# Patient Record
Sex: FEMALE | Race: BLACK OR AFRICAN AMERICAN | Employment: OTHER | URBAN - METROPOLITAN AREA
[De-identification: names, ages, dates, MRNs, and addresses within clinical notes are randomized per-mention and may not be internally consistent; named-entity substitution may affect disease eponyms.]

---

## 2020-12-02 ENCOUNTER — OFFICE VISIT (OUTPATIENT)
Dept: ENDOCRINOLOGY | Age: 35
End: 2020-12-02
Payer: MEDICAID

## 2020-12-02 VITALS
HEIGHT: 64 IN | HEART RATE: 99 BPM | TEMPERATURE: 97.1 F | WEIGHT: 199.8 LBS | OXYGEN SATURATION: 98 % | SYSTOLIC BLOOD PRESSURE: 135 MMHG | BODY MASS INDEX: 34.11 KG/M2 | DIASTOLIC BLOOD PRESSURE: 94 MMHG

## 2020-12-02 DIAGNOSIS — E11.65 TYPE 2 DIABETES MELLITUS WITH HYPERGLYCEMIA, WITH LONG-TERM CURRENT USE OF INSULIN (HCC): Primary | ICD-10-CM

## 2020-12-02 DIAGNOSIS — Z79.4 TYPE 2 DIABETES MELLITUS WITH HYPERGLYCEMIA, WITH LONG-TERM CURRENT USE OF INSULIN (HCC): Primary | ICD-10-CM

## 2020-12-02 DIAGNOSIS — M79.672 LEFT FOOT PAIN: ICD-10-CM

## 2020-12-02 PROBLEM — E78.2 MIXED HYPERLIPIDEMIA: Status: ACTIVE | Noted: 2020-12-02

## 2020-12-02 PROBLEM — Z88.9 MULTIPLE DRUG ALLERGIES: Status: ACTIVE | Noted: 2020-12-02

## 2020-12-02 PROBLEM — I10 BENIGN ESSENTIAL HTN: Status: ACTIVE | Noted: 2020-12-02

## 2020-12-02 LAB — GLUCOSE POC: 264 MG/DL

## 2020-12-02 PROCEDURE — 99214 OFFICE O/P EST MOD 30 MIN: CPT | Performed by: INTERNAL MEDICINE

## 2020-12-02 PROCEDURE — 82962 GLUCOSE BLOOD TEST: CPT | Performed by: INTERNAL MEDICINE

## 2020-12-02 RX ORDER — INSULIN LISPRO 100 [IU]/ML
INJECTION, SUSPENSION SUBCUTANEOUS
Qty: 5 ADJUSTABLE DOSE PRE-FILLED PEN SYRINGE | Refills: 3 | Status: SHIPPED | OUTPATIENT
Start: 2020-12-02 | End: 2021-03-05 | Stop reason: SDUPTHER

## 2020-12-02 RX ORDER — TRIAMCINOLONE ACETONIDE 0.25 MG/ML
LOTION TOPICAL AS NEEDED
COMMUNITY

## 2020-12-02 RX ORDER — GLUCOSAM/CHON-MSM1/C/MANG/BOSW 500-416.6
TABLET ORAL
Qty: 100 LANCET | Refills: 3 | Status: SHIPPED | OUTPATIENT
Start: 2020-12-02 | End: 2021-03-05 | Stop reason: SDUPTHER

## 2020-12-02 RX ORDER — PEN NEEDLE, DIABETIC 30 GX3/16"
NEEDLE, DISPOSABLE MISCELLANEOUS
Qty: 1 PACKAGE | Refills: 5 | Status: SHIPPED | OUTPATIENT
Start: 2020-12-02 | End: 2021-03-05 | Stop reason: SDUPTHER

## 2020-12-02 RX ORDER — CALCIUM CITRATE/VITAMIN D3 200MG-6.25
TABLET ORAL
COMMUNITY
Start: 2020-11-28 | End: 2020-12-02 | Stop reason: SDUPTHER

## 2020-12-02 RX ORDER — CALCIUM CITRATE/VITAMIN D3 200MG-6.25
TABLET ORAL
Qty: 100 STRIP | Refills: 3 | Status: SHIPPED | OUTPATIENT
Start: 2020-12-02 | End: 2021-03-05 | Stop reason: SDUPTHER

## 2020-12-02 RX ORDER — CHOLECALCIFEROL (VITAMIN D3) 125 MCG
CAPSULE ORAL
COMMUNITY
End: 2022-03-21

## 2020-12-02 RX ORDER — GLUCOSAM/CHON-MSM1/C/MANG/BOSW 500-416.6
TABLET ORAL
COMMUNITY
Start: 2020-08-31 | End: 2020-12-02 | Stop reason: SDUPTHER

## 2020-12-02 RX ORDER — LISINOPRIL 2.5 MG/1
TABLET ORAL DAILY
COMMUNITY
End: 2022-03-21

## 2020-12-02 RX ORDER — ACETAMINOPHEN 325 MG/1
TABLET ORAL
COMMUNITY
End: 2022-03-21

## 2020-12-02 RX ORDER — INSULIN LISPRO 100 [IU]/ML
INJECTION, SUSPENSION SUBCUTANEOUS
COMMUNITY
Start: 2020-12-01 | End: 2020-12-02 | Stop reason: SDUPTHER

## 2020-12-02 NOTE — PROGRESS NOTES
History and Physical    Patient: Nory Orellana MRN: [id number]  SSN: [id number]    YOB: 1985  Age: 38 y.o. Sex: female      Subjective:      Nory Orellana is a 38 y.o. female with past medical history of hypertension, seizure disorder is here for follow-up of type 2 diabetes mellitus. She remains in primary care of Dr. Rosalita Lombard. patient is not tolerant to several diabetes medications. Bea Evans continues to have issues with cervical dystonia. She is taking Humalog 75/25, 25 units in the morning, 20 units in the evening. Sometimes she forgets and takes it after she eats. She checks blood glucose 1-2 times per day. Not able to follow diabetic diet. Not able to increase insulin dose because it makes her feel weak and tired although her blood glucose may not drop. She is trying her best to follow diabetic diet but because of financial and transportation limitations, she ends up eating junk food here and there. Today she is also telling me that she has skin infection at the site of insulin administration. She had diabetic eye exam and she was found to have diabetic retinopathy in left eye, laser surgery was recommended. Patient has not been able to make follow-up with his ophthalmologist because of the pandemic and she is looking for a new ophthalmologist.    Glucometer reading: checking 1-2 times per day, sometimes missing days.  Readings ranging from 93 to 417 mg/dL    updated diabetes history:  · Diagnosis: 2008    · Current treatment: Humalog 75/25, 25 units in the morning, 20 units before dinner    · Past treatment: Glipizide (shaking and constipation), Metformin (diarrhea), Januvia (rash), Glimepiride (shaking), Lantus    · Glucose checks: 200s fasting    · Hyperglycemia: yes, polyuria and polydipsia    · Hypoglycemia: no    · Meals per day: 3, breakfast: cereal, lunch: lunch meat and cheese with crackers, dinner: greens, chicken, snacks: whole grain peanut butter crackers, water, gatorade    · Exercise: walks 30 mins everyday    · DM related hospitalizations: yes long time back        Complications of DM:    · CAD: no    · CVA: no    · PVD: no    · Amputations: no     · Retinopathy: yes; last exam was 3/2019, next appointment tomorrow    · Gastropathy: no    · Nephropathy: no    · Neuropathy: yes        Medications:    · Statin: no    · ACE-I: lisinopril 2.5    · ASA: no        · Diabetes education: yes, 2012, 2014    Past Medical History:   Diagnosis Date    Diabetes insipidus ([address]     Hypertension      Past Surgical History:   Procedure Laterality Date    DC EXTRAC ERUPTED TOOTH/EXPOSED ROOT        Family History   Problem Relation Age of Onset    Diabetes Mother     Hypertension Mother     Hypertension Father      Social History     Tobacco Use    Smoking status: Never Smoker    Smokeless tobacco: Never Used   Substance Use Topics    Alcohol use: Not Currently      Prior to Admission medications    Medication Sig Start Date End Date Taking? Authorizing Provider   triamcinolone acetonide 0.025 % lotion Apply  to affected area two (2) times a day. Yes Provider, Historical   lisinopriL (PRINIVIL, ZESTRIL) 2.5 mg tablet Take  by mouth daily. Yes Provider, Historical   acetaminophen (TylenoL) 325 mg tablet Take  by mouth every four (4) hours as needed for Pain. Yes Provider, Historical   naproxen sodium (Aleve) 220 mg cap Take  by mouth.    Yes Provider, Historical   HumaLOG Mix 75-25 KwikPen flexpen Inject 25 units twice a day before meals 12/2/20  Yes Preeti Dozier MD   TRUEplus Lancets 28 gauge misc Use to check glucose 33 times a day 12/2/20  Yes Preeti Dozier MD   True Metrix Glucose Test Strip strip Use to check glucose 3 times a day 12/2/20  Yes Preeti Dozier MD   Insulin Needles, Disposable, 31 gauge x 5/16\" ndle Use to take insulin twice a day 12/2/20  Yes Preeti Dozier MD        Allergies   Allergen Reactions    Ammonium Lactate Unknown (comments)    Azithromycin Unknown (comments)    Buspirone Unknown (comments)    Clindamycin Unknown (comments)    Codeine Hives    Cold Relief [Cpm-Phenyleph-Acetaminophen] Unknown (comments)    Doxycycline Unknown (comments)    Fenofibrate Unknown (comments)    Gabapentin Unknown (comments)    Glimepiride Unknown (comments)    Glipizide Unknown (comments)    Januvia [Sitagliptin] Unknown (comments)    Lantus Solostar U-100 Insulin [Insulin Glargine] Unknown (comments)    Metformin Unknown (comments)    Metronidazole Unknown (comments)    Omeprazole Unknown (comments)    Penicillins Hives    Promethazine Unknown (comments)    Tramadol Unknown (comments)    Zanaflex [Tizanidine] Unknown (comments)    Ziprasidone Unknown (comments)    Zyrtec [Cetirizine] Unknown (comments)       Review of Systems:  ROS    A comprehensive review of systems was preformed and it is negative except mentioned in HPI    Objective:     Vitals:    12/02/20 1412   BP: (!) 135/94   Pulse: 99   Temp: 97.1 °F (36.2 °C)   TempSrc: Temporal   SpO2: 98%   Weight: 199 lb 12.8 oz (90.6 kg)   Height: 5' 3.8\" (1.621 m)        Physical Exam:    Physical Exam  Vitals signs and nursing note reviewed. Constitutional:       Appearance: Normal appearance. HENT:      Head: Normocephalic and atraumatic. Cardiovascular:      Rate and Rhythm: Normal rate and regular rhythm. Pulmonary:      Effort: Pulmonary effort is normal.      Breath sounds: Normal breath sounds. Neurological:      Mental Status: She is alert.           Labs and Imaging:    Last 3 Recorded Weights in this Encounter    12/02/20 1412   Weight: 199 lb 12.8 oz (90.6 kg)        No results found for: HBA1C, HGBE8, FYS2DZBZ, VTI6PXZE, FHA7YBHZ     Assessment:     Patient Active Problem List   Diagnosis Code    Type 2 diabetes mellitus with hyperglycemia, with long-term current use of insulin (HCC) E11.65, Z79.4    Left foot pain M79.672    Benign essential HTN I10    Mixed hyperlipidemia E78.2    Multiple drug allergies Z88.9           Plan:     type II diabetes mellitus uncontrolled  Hemoglobin A1c was 9.9% on 7-, 7.8% on 10-, 10.8% on 2-, 11.7% on 5-. Fingerstick blood glucose is 264 mg/dL in my office today. Up to date with diabetes related annual labs: yes    Up to date with diabetic eye exam: yes    patient has not tolerated metformin, Januvia, glipizide and glimepiride in the past  plan:  reinforced to patient importance of cutting down on carbohydrate intake, avoiding snacking. Physical activity as tolerated. Discussed with patient that given her intolerance to several medications, our options are limited. Since she frequently forgets to check her blood glucose, basal/bolus regimen may not be ideal for her. For this reason I am continuing with premixed insulin. As patient is not agreeable to increase her dose, I will continue with Humalog 75/25, 25 units twice a day. Advised patient to work on diabetic diet. Check blood glucose 3 times a day and I will see her back in 3 months. retinopathy with type 2 diabetes mellitus  diabetic retinopathy in left eye. Encouraged patient to go ahead with laser surgery to preserve her vision. Patient is unable to schedule appointment with the previous ophthalmologist and she is looking for a new one.    essential hypertension  BP well controlled on current medications. no microalbuminuria. mixed hyperlipidemia  labs 451943 showed total cholesterol elevated at 232, LDL elevated at 149. Patient was educated about low fat diet. 5-: LDL elevated at 211, total cholesterol elevated at 287. Patient has not tolerated statins in the past, unknown reaction. She is not agreeable to start another medication trial at this time. Discussed with patient about low-fat diet and increasing physical activity.   Orders Placed This Encounter    HEMOGLOBIN A1C WITH EAG    REFERRAL TO PODIATRY     Referral Priority:   Routine     Referral Type:   Consultation     Referral Reason:   Specialty Services Required     Referred to Provider:   Gianna Mckeon DPM     Number of Visits Requested:   1    AMB POC GLUCOSE BLOOD, BY GLUCOSE MONITORING DEVICE    HumaLOG Mix 75-25 KwikPen flexpen     Sig: Inject 25 units twice a day before meals     Dispense:  5 Adjustable Dose Pre-filled Pen Syringe     Refill:  3    TRUEplus Lancets 28 gauge misc     Sig: Use to check glucose 33 times a day     Dispense:  100 Lancet     Refill:  3    True Metrix Glucose Test Strip strip     Sig: Use to check glucose 3 times a day     Dispense:  100 Strip     Refill:  3    Insulin Needles, Disposable, 31 gauge x 5/16\" ndle     Sig: Use to take insulin twice a day     Dispense:  1 Package     Refill:  5        Signed By: Dani Ham MD     December 2, 2020      Return to clinic 3 months

## 2020-12-03 LAB
EST. AVERAGE GLUCOSE BLD GHB EST-MCNC: 240 MG/DL
HBA1C MFR BLD: 10 % (ref 4.8–5.6)

## 2020-12-08 ENCOUNTER — TELEPHONE (OUTPATIENT)
Dept: ENDOCRINOLOGY | Age: 35
End: 2020-12-08

## 2020-12-08 NOTE — TELEPHONE ENCOUNTER
Left patient a vm asking to give the office a call back in regards to labs    ----- Message from Gatito Diaz MD sent at 12/3/2020 10:40 AM EST -----  Please inform patient that her hemoglobin A1c is 10%. It was 11.7% last time.

## 2020-12-11 ENCOUNTER — TELEPHONE (OUTPATIENT)
Dept: ENDOCRINOLOGY | Age: 35
End: 2020-12-11

## 2020-12-11 NOTE — TELEPHONE ENCOUNTER
Left patient a vm asking to give the office a call back in regards to her labs    ----- Message from Nadeem Martinez MD sent at 12/3/2020 10:40 AM EST -----  Please inform patient that her hemoglobin A1c is 10%. It was 11.7% last time.

## 2020-12-16 ENCOUNTER — TELEPHONE (OUTPATIENT)
Dept: ENDOCRINOLOGY | Age: 35
End: 2020-12-16

## 2021-03-05 ENCOUNTER — OFFICE VISIT (OUTPATIENT)
Dept: ENDOCRINOLOGY | Age: 36
End: 2021-03-05
Payer: MEDICAID

## 2021-03-05 VITALS
OXYGEN SATURATION: 99 % | BODY MASS INDEX: 34.19 KG/M2 | SYSTOLIC BLOOD PRESSURE: 149 MMHG | HEIGHT: 64 IN | WEIGHT: 200.3 LBS | DIASTOLIC BLOOD PRESSURE: 106 MMHG | TEMPERATURE: 97.8 F | HEART RATE: 104 BPM

## 2021-03-05 DIAGNOSIS — Z79.4 TYPE 2 DIABETES MELLITUS WITH HYPERGLYCEMIA, WITH LONG-TERM CURRENT USE OF INSULIN (HCC): Primary | ICD-10-CM

## 2021-03-05 DIAGNOSIS — E11.65 TYPE 2 DIABETES MELLITUS WITH HYPERGLYCEMIA, WITH LONG-TERM CURRENT USE OF INSULIN (HCC): Primary | ICD-10-CM

## 2021-03-05 LAB
GLUCOSE POC: 229 MG/DL
HBA1C MFR BLD HPLC: 11.1 %

## 2021-03-05 PROCEDURE — 83036 HEMOGLOBIN GLYCOSYLATED A1C: CPT | Performed by: INTERNAL MEDICINE

## 2021-03-05 PROCEDURE — 99213 OFFICE O/P EST LOW 20 MIN: CPT | Performed by: INTERNAL MEDICINE

## 2021-03-05 PROCEDURE — 82962 GLUCOSE BLOOD TEST: CPT | Performed by: INTERNAL MEDICINE

## 2021-03-05 RX ORDER — CALCIUM CITRATE/VITAMIN D3 200MG-6.25
TABLET ORAL
Qty: 100 STRIP | Refills: 3 | Status: SHIPPED | OUTPATIENT
Start: 2021-03-05 | End: 2022-03-21

## 2021-03-05 RX ORDER — PEN NEEDLE, DIABETIC 30 GX3/16"
NEEDLE, DISPOSABLE MISCELLANEOUS
Qty: 1 PACKAGE | Refills: 5 | Status: SHIPPED | OUTPATIENT
Start: 2021-03-05 | End: 2022-03-21

## 2021-03-05 RX ORDER — PEN NEEDLE, DIABETIC 32GX 5/32"
NEEDLE, DISPOSABLE MISCELLANEOUS
COMMUNITY
Start: 2020-12-07 | End: 2022-03-21

## 2021-03-05 RX ORDER — INSULIN LISPRO 100 [IU]/ML
INJECTION, SUSPENSION SUBCUTANEOUS
Qty: 5 ADJUSTABLE DOSE PRE-FILLED PEN SYRINGE | Refills: 3 | Status: SHIPPED | OUTPATIENT
Start: 2021-03-05 | End: 2022-03-21

## 2021-03-05 RX ORDER — GLUCOSAM/CHON-MSM1/C/MANG/BOSW 500-416.6
TABLET ORAL
Qty: 100 LANCET | Refills: 3 | Status: SHIPPED | OUTPATIENT
Start: 2021-03-05 | End: 2022-03-21

## 2021-03-05 NOTE — LETTER
3/5/2021 Patient: Kaveh Burnham YOB: 1985 Date of Visit: 3/5/2021 Avi Yusuf MD 
[address] [address] [address] Fax: [phone] Dear Avi Yusuf MD, Thank you for referring Ms. Kaveh Burnham to [address] [place] [address] for evaluation. My notes for this consultation are attached. If you have questions, please do not hesitate to call me. I look forward to following your patient along with you. Sincerely, Kelley Elizabeth MD

## 2021-03-05 NOTE — PROGRESS NOTES
History and Physical    Patient: Marina Macario MRN: [id number]  SSN: [id number]    YOB: 1985  Age: 38 y.o. Sex: female      Subjective:      Marina Macario is a 38 y.o. female with past medical history of hypertension, seizure disorder is here for follow-up of type 2 diabetes mellitus. She remains in primary care of Dr. Octavio Mayen. patient is not tolerant to several diabetes medications. Gerhardt Rake continues to have issues with cervical dystonia. She is taking Humalog 75/25, 25 units in the morning, 20 units in the evening. Sometimes she gives herself a break and does not take her insulin. She has not been checking her blood glucose for at least for the past 1 month because she misplaced her glucometer again. She has not made any significant changes in her eating habits. She had diabetic eye exam and she was found to have diabetic retinopathy in left eye, laser surgery was recommended. Patient has not been able to make follow-up with his ophthalmologist because of the pandemic and she is looking for a new ophthalmologist.  At the last visit she was advised to call her insurance to check about finding another ophthalmologist but she has not done this. Glucometer reading: Did not bring meter today.     updated diabetes history:  · Diagnosis: 2008    · Current treatment: Humalog 75/25, 25 units in the morning, 20 units before dinner    · Past treatment: Glipizide (shaking and constipation), Metformin (diarrhea), Januvia (rash), Glimepiride (shaking), Lantus    · Glucose checks: 200s fasting    · Hyperglycemia: yes, polyuria and polydipsia    · Hypoglycemia: no    · Meals per day: 3, breakfast: cereal, lunch: lunch meat and cheese with crackers, dinner: greens, chicken, snacks: whole grain peanut butter crackers, water, gatorade    · Exercise: walks 30 mins everyday    · DM related hospitalizations: yes long time back        Complications of DM:    · CAD: no    · CVA: no    · PVD: no    · Amputations: no     · Retinopathy: yes; last exam was 3/2019, next appointment tomorrow    · Gastropathy: no    · Nephropathy: no    · Neuropathy: yes        Medications:    · Statin: no    · ACE-I: lisinopril 2.5    · ASA: no        · Diabetes education: yes, 2012, 2014    Past Medical History:   Diagnosis Date    Diabetes insipidus ([address]     Hypertension      Past Surgical History:   Procedure Laterality Date    ND EXTRAC ERUPTED TOOTH/EXPOSED ROOT        Family History   Problem Relation Age of Onset    Diabetes Mother     Hypertension Mother     Hypertension Father      Social History     Tobacco Use    Smoking status: Never Smoker    Smokeless tobacco: Never Used   Substance Use Topics    Alcohol use: Not Currently      Prior to Admission medications    Medication Sig Start Date End Date Taking? Authorizing Provider   BD Radha 2nd Gen Pen Needle 32 gauge x 5/32\" ndle USE AS DIRECTED TWICE A DAY 12/7/20  Yes Provider, Historical   Insulin Needles, Disposable, 31 gauge x 5/16\" ndle Use to take insulin twice a day 3/5/21  Yes Paul Carrero MD   HumaLOG Mix 75-25 KwikPen flexpen Inject 25 units twice a day before meals 3/5/21  Yes Paul Carrero MD   True Metrix Glucose Test Strip strip Use to check glucose 3 times a day 3/5/21  Yes Paul Carrero MD   TRUEplus Lancets 28 gauge misc Use to check glucose 33 times a day 3/5/21  Yes Paul Carrero MD   triamcinolone acetonide 0.025 % lotion Apply  to affected area two (2) times a day. Yes Provider, Historical   lisinopriL (PRINIVIL, ZESTRIL) 2.5 mg tablet Take  by mouth daily. Yes Provider, Historical   acetaminophen (TylenoL) 325 mg tablet Take  by mouth every four (4) hours as needed for Pain. Yes Provider, Historical   naproxen sodium (Aleve) 220 mg cap Take  by mouth.    Yes Provider, Historical        Allergies   Allergen Reactions    Ammonium Lactate Unknown (comments)    Azithromycin Unknown (comments)    Buspirone Unknown (comments)    Clindamycin Unknown (comments)    Codeine Hives    Cold Relief [Cpm-Phenyleph-Acetaminophen] Unknown (comments)    Doxycycline Unknown (comments)    Fenofibrate Unknown (comments)    Gabapentin Unknown (comments)    Glimepiride Unknown (comments)    Glipizide Unknown (comments)    Januvia [Sitagliptin] Unknown (comments)    Lantus Solostar U-100 Insulin [Insulin Glargine] Unknown (comments)    Metformin Unknown (comments)    Metronidazole Unknown (comments)    Omeprazole Unknown (comments)    Penicillins Hives    Promethazine Unknown (comments)    Tramadol Unknown (comments)    Zanaflex [Tizanidine] Unknown (comments)    Ziprasidone Unknown (comments)    Zyrtec [Cetirizine] Unknown (comments)       Review of Systems:  ROS    A comprehensive review of systems was preformed and it is negative except mentioned in HPI    Objective:     Vitals:    03/05/21 1023 03/05/21 1030   BP: (!) 155/102 (!) 149/106   Pulse: 94 (!) 104   Temp: 97.8 °F (36.6 °C)    TempSrc: Temporal    SpO2: 99%    Weight: 200 lb 4.8 oz (90.9 kg)    Height: 5' 4\" (1.626 m)         Physical Exam:    Physical Exam  Vitals signs and nursing note reviewed. Constitutional:       Appearance: Normal appearance. HENT:      Head: Normocephalic and atraumatic. Cardiovascular:      Rate and Rhythm: Normal rate and regular rhythm. Pulmonary:      Effort: Pulmonary effort is normal.      Breath sounds: Normal breath sounds. Neurological:      Mental Status: She is alert.           Labs and Imaging:    Last 3 Recorded Weights in this Encounter    03/05/21 1023   Weight: 200 lb 4.8 oz (90.9 kg)        Lab Results   Component Value Date/Time    Hemoglobin A1c 10.0 (H) 12/02/2020 02:58 PM        Assessment:     Patient Active Problem List   Diagnosis Code    Type 2 diabetes mellitus with hyperglycemia, with long-term current use of insulin (HCC) E11.65, Z79.4    Left foot pain M79.672    Benign essential HTN I10    Mixed hyperlipidemia E78.2    Multiple drug allergies Z88.9           Plan:     type II diabetes mellitus uncontrolled  Hemoglobin A1c was 9.9% on 7-, 7.8% on 10-, 10.8% on 2-, 11.7% on 5-, 10% on 12-2-2020, 11.1% today. Fingerstick blood glucose is 229 mg/dL in my office today. Up to date with diabetes related annual labs: yes    Up to date with diabetic eye exam: no    patient has not tolerated metformin, Januvia, glipizide and glimepiride in the past  plan:  Explained to patient that there has not been any improvement in her diabetes in the past year and a half that I have been treating her. She has several medication intolerances and our options are limited. She does not check blood glucose regularly, so basal/bolus regimen is not appropriate for her. I kept her on premixed insulin so that it will be less number of injections. However, she keeps misplacing her glucometer, does not check blood glucose regularly, she has not made any changes in her eating habits. Explained to patient that I am doing my best but since I do not see any improvement in her diabetes control, I would like her to see another endocrinologist for a fresh start. I am giving her enough insulin and testing supplies for next 3 months. She should call her insurance company to find another endocrinologist in network. retinopathy with type 2 diabetes mellitus  diabetic retinopathy in left eye. Encouraged patient to go ahead with laser surgery to preserve her vision. Patient is unable to schedule appointment with the previous ophthalmologist and she is looking for a new one.    essential hypertension  BP well controlled on current medications. no microalbuminuria. mixed hyperlipidemia  labs 772753 showed total cholesterol elevated at 232, LDL elevated at 149. Patient was educated about low fat diet. 5-: LDL elevated at 211, total cholesterol elevated at 287.  Patient has not tolerated statins in the past, unknown reaction. She is not agreeable to start another medication trial at this time. Discussed with patient about low-fat diet and increasing physical activity.   Orders Placed This Encounter    AMB POC HEMOGLOBIN A1C    AMB POC GLUCOSE BLOOD, BY GLUCOSE MONITORING DEVICE    Insulin Needles, Disposable, 31 gauge x 5/16\" ndle     Sig: Use to take insulin twice a day     Dispense:  1 Package     Refill:  5    HumaLOG Mix 75-25 KwikPen flexpen     Sig: Inject 25 units twice a day before meals     Dispense:  5 Adjustable Dose Pre-filled Pen Syringe     Refill:  3    True Metrix Glucose Test Strip strip     Sig: Use to check glucose 3 times a day     Dispense:  100 Strip     Refill:  3    TRUEplus Lancets 28 gauge misc     Sig: Use to check glucose 33 times a day     Dispense:  100 Lancet     Refill:  3        Signed By: Darío Chavira MD     March 5, 2021      Return to clinic as needed

## 2021-12-30 ENCOUNTER — TRANSCRIBE ORDER (OUTPATIENT)
Dept: REGISTRATION | Age: 36
End: 2021-12-30

## 2021-12-30 ENCOUNTER — HOSPITAL ENCOUNTER (OUTPATIENT)
Dept: GENERAL RADIOLOGY | Age: 36
Discharge: HOME | End: 2021-12-30
Payer: COMMERCIAL

## 2021-12-30 DIAGNOSIS — Z02.71 ISSUE OF INCAPACITY CERTIFICATE: ICD-10-CM

## 2021-12-30 DIAGNOSIS — Z02.71 ISSUE OF INCAPACITY CERTIFICATE: Primary | ICD-10-CM

## 2021-12-30 PROCEDURE — 73590 X-RAY EXAM OF LOWER LEG: CPT

## 2021-12-30 PROCEDURE — 73560 X-RAY EXAM OF KNEE 1 OR 2: CPT

## 2022-02-25 ENCOUNTER — TRANSCRIBE ORDER (OUTPATIENT)
Dept: SCHEDULING | Age: 37
End: 2022-02-25

## 2022-02-25 DIAGNOSIS — R07.9 CHEST PAIN: Primary | ICD-10-CM

## 2022-03-14 ENCOUNTER — HOSPITAL ENCOUNTER (OUTPATIENT)
Dept: NON INVASIVE DIAGNOSTICS | Age: 37
Discharge: HOME | End: 2022-03-14
Attending: NURSE PRACTITIONER
Payer: MEDICAID

## 2022-03-14 ENCOUNTER — HOSPITAL ENCOUNTER (OUTPATIENT)
Dept: NUCLEAR MEDICINE | Age: 37
Discharge: HOME | End: 2022-03-14
Attending: NURSE PRACTITIONER
Payer: MEDICAID

## 2022-03-14 ENCOUNTER — HOSPITAL ENCOUNTER (OUTPATIENT)
Dept: VASCULAR SURGERY | Age: 37
Discharge: HOME | End: 2022-03-14
Attending: NURSE PRACTITIONER
Payer: MEDICAID

## 2022-03-14 DIAGNOSIS — R07.9 CHEST PAIN: ICD-10-CM

## 2022-03-14 LAB
ECHO AV ANNULUS DIAM: 2.5 CM
ECHO AV AREA PEAK VELOCITY: 2.6 CM2
ECHO AV AREA VTI: 2.7 CM2
ECHO AV MEAN GRADIENT: 3 MMHG
ECHO AV PEAK VELOCITY: 1.1 M/S
ECHO AV VTI: 19.4 CM
ECHO EST RA PRESSURE: 3 MMHG
ECHO LA VOL 2C: 23 ML (ref 22–52)
ECHO LV EDV A2C: 33 ML
ECHO LV EDV A4C: 40 ML
ECHO LV EJECTION FRACTION BIPLANE: 64 % (ref 55–100)
ECHO LV ESV A2C: 9 ML
ECHO LV ESV A4C: 20 ML
ECHO LV FRACTIONAL SHORTENING: 29 % (ref 28–44)
ECHO LV INTERNAL DIMENSION DIASTOLIC: 3.4 CM (ref 3.9–5.3)
ECHO LV INTERNAL DIMENSION SYSTOLIC: 2.4 CM
ECHO LV IVSD: 1.1 CM (ref 0.6–0.9)
ECHO LV MASS 2D: 114 G (ref 67–162)
ECHO LV POSTERIOR WALL DIASTOLIC: 1.1 CM (ref 0.6–0.9)
ECHO LV RELATIVE WALL THICKNESS RATIO: 0.65
ECHO LVOT AREA: 3.5 CM2
ECHO LVOT AV VTI INDEX: 0.74
ECHO LVOT DIAM: 2.1 CM
ECHO LVOT MEAN GRADIENT: 1 MMHG
ECHO LVOT PEAK GRADIENT: 3 MMHG
ECHO LVOT SV: 49.5 ML
ECHO LVOT VTI: 14.3 CM
ECHO MV A VELOCITY: 0.55 M/S
ECHO MV AREA PHT: 4 CM2
ECHO MV E DECELERATION TIME (DT): 189.4 MS
ECHO MV E VELOCITY: 0.55 M/S
ECHO MV E/A RATIO: 1
ECHO MV PRESSURE HALF TIME (PHT): 54.9 MS
ECHO RIGHT VENTRICULAR SYSTOLIC PRESSURE (RVSP): 12 MMHG
ECHO RV INTERNAL DIMENSION: 2.5 CM
ECHO RV TAPSE: 1.3 CM (ref 1.5–2)
ECHO TRICUSPID ANNULAR PEAK SYSTOLIC VELOCITY: 11 CM/S
ECHO TV REGURGITANT MAX VELOCITY: 1.54 M/S
NUC STRESS EJECTION FRACTION: 60 %
STRESS BASELINE DIAS BP: 89 MMHG
STRESS BASELINE HR: 87 BPM
STRESS BASELINE SYS BP: 135 MMHG
STRESS PEAK DIAS BP: 96 MMHG
STRESS PEAK SYS BP: 150 MMHG
STRESS PERCENT HR ACHIEVED: 62 %
STRESS POST PEAK HR: 113 BPM
STRESS RATE PRESSURE PRODUCT: NORMAL BPM*MMHG
STRESS ST DEPRESSION: 0 MM
STRESS TARGET HR: 182 BPM

## 2022-03-14 PROCEDURE — 93306 TTE W/DOPPLER COMPLETE: CPT

## 2022-03-14 PROCEDURE — A9500 TC99M SESTAMIBI: HCPCS

## 2022-03-14 PROCEDURE — 93017 CV STRESS TEST TRACING ONLY: CPT

## 2022-03-14 PROCEDURE — 74011250636 HC RX REV CODE- 250/636: Performed by: NURSE PRACTITIONER

## 2022-03-14 PROCEDURE — 74011000258 HC RX REV CODE- 258: Performed by: NURSE PRACTITIONER

## 2022-03-14 RX ORDER — TETRAKIS(2-METHOXYISOBUTYLISOCYANIDE)COPPER(I) TETRAFLUOROBORATE 1 MG/ML
10 INJECTION, POWDER, LYOPHILIZED, FOR SOLUTION INTRAVENOUS
Status: COMPLETED | OUTPATIENT
Start: 2022-03-14 | End: 2022-03-14

## 2022-03-14 RX ORDER — TETRAKIS(2-METHOXYISOBUTYLISOCYANIDE)COPPER(I) TETRAFLUOROBORATE 1 MG/ML
30 INJECTION, POWDER, LYOPHILIZED, FOR SOLUTION INTRAVENOUS
Status: COMPLETED | OUTPATIENT
Start: 2022-03-14 | End: 2022-03-14

## 2022-03-14 RX ADMIN — TETRAKIS(2-METHOXYISOBUTYLISOCYANIDE)COPPER(I) TETRAFLUOROBORATE 30 MILLICURIE: 1 INJECTION, POWDER, LYOPHILIZED, FOR SOLUTION INTRAVENOUS at 08:26

## 2022-03-14 RX ADMIN — ADENOSINE: 3 INJECTION, SOLUTION INTRAVENOUS at 09:16

## 2022-03-14 RX ADMIN — TETRAKIS(2-METHOXYISOBUTYLISOCYANIDE)COPPER(I) TETRAFLUOROBORATE 10 MILLICURIE: 1 INJECTION, POWDER, LYOPHILIZED, FOR SOLUTION INTRAVENOUS at 07:00

## 2022-03-17 ENCOUNTER — HOSPITAL ENCOUNTER (INPATIENT)
Age: 37
LOS: 4 days | Discharge: ANOTHER HOSPITAL | DRG: 190 | End: 2022-03-21
Attending: EMERGENCY MEDICINE | Admitting: INTERNAL MEDICINE
Payer: MEDICAID

## 2022-03-17 ENCOUNTER — APPOINTMENT (OUTPATIENT)
Dept: GENERAL RADIOLOGY | Age: 37
DRG: 190 | End: 2022-03-17
Attending: STUDENT IN AN ORGANIZED HEALTH CARE EDUCATION/TRAINING PROGRAM
Payer: MEDICAID

## 2022-03-17 DIAGNOSIS — I24.9 ACS (ACUTE CORONARY SYNDROME) (HCC): Primary | ICD-10-CM

## 2022-03-17 DIAGNOSIS — R07.9 CHEST PAIN, UNSPECIFIED TYPE: ICD-10-CM

## 2022-03-17 LAB
ALBUMIN SERPL-MCNC: 3.9 G/DL (ref 3.5–5)
ALBUMIN/GLOB SERPL: 1.1 {RATIO} (ref 1.1–2.2)
ALP SERPL-CCNC: 105 U/L (ref 45–117)
ALT SERPL-CCNC: 22 U/L (ref 12–78)
AMPHET UR QL SCN: NEGATIVE
ANION GAP SERPL CALC-SCNC: 5 MMOL/L (ref 5–15)
APPEARANCE UR: CLEAR
APTT PPP: 29.9 SEC (ref 21.2–34.1)
AST SERPL W P-5'-P-CCNC: 12 U/L (ref 15–37)
ATRIAL RATE: 92 BPM
BACTERIA URNS QL MICRO: NEGATIVE /HPF
BARBITURATES UR QL SCN: NEGATIVE
BASOPHILS # BLD: 0.1 K/UL (ref 0–0.1)
BASOPHILS NFR BLD: 1 % (ref 0–1)
BENZODIAZ UR QL: NEGATIVE
BILIRUB SERPL-MCNC: 0.5 MG/DL (ref 0.2–1)
BILIRUB UR QL: NEGATIVE
BUN SERPL-MCNC: 8 MG/DL (ref 6–20)
BUN/CREAT SERPL: 12 (ref 12–20)
CA-I BLD-MCNC: 9.2 MG/DL (ref 8.5–10.1)
CALCULATED P AXIS, ECG09: 50 DEGREES
CALCULATED R AXIS, ECG10: -5 DEGREES
CALCULATED T AXIS, ECG11: 20 DEGREES
CANNABINOIDS UR QL SCN: NEGATIVE
CHLORIDE SERPL-SCNC: 104 MMOL/L (ref 97–108)
CO2 SERPL-SCNC: 25 MMOL/L (ref 21–32)
COCAINE UR QL SCN: NEGATIVE
COLOR UR: ABNORMAL
CREAT SERPL-MCNC: 0.66 MG/DL (ref 0.55–1.02)
DIAGNOSIS, 93000: NORMAL
DIFFERENTIAL METHOD BLD: ABNORMAL
DRUG SCRN COMMENT,DRGCM: NORMAL
EOSINOPHIL # BLD: 0.1 K/UL (ref 0–0.4)
EOSINOPHIL NFR BLD: 1 % (ref 0–7)
ERYTHROCYTE [DISTWIDTH] IN BLOOD BY AUTOMATED COUNT: 11.2 % (ref 11.5–14.5)
GLOBULIN SER CALC-MCNC: 3.5 G/DL (ref 2–4)
GLUCOSE SERPL-MCNC: 321 MG/DL (ref 65–100)
GLUCOSE UR STRIP.AUTO-MCNC: >300 MG/DL
HCG UR QL: NEGATIVE
HCT VFR BLD AUTO: 38.3 % (ref 35–47)
HGB BLD-MCNC: 13.6 G/DL (ref 11.5–16)
HGB UR QL STRIP: NEGATIVE
IMM GRANULOCYTES # BLD AUTO: 0 K/UL (ref 0–0.04)
IMM GRANULOCYTES NFR BLD AUTO: 0 % (ref 0–0.5)
KETONES UR QL STRIP.AUTO: 20 MG/DL
LEUKOCYTE ESTERASE UR QL STRIP.AUTO: NEGATIVE
LYMPHOCYTES # BLD: 2.7 K/UL (ref 0.8–3.5)
LYMPHOCYTES NFR BLD: 30 % (ref 12–49)
MCH RBC QN AUTO: 30.7 PG (ref 26–34)
MCHC RBC AUTO-ENTMCNC: 35.5 G/DL (ref 30–36.5)
MCV RBC AUTO: 86.5 FL (ref 80–99)
METHADONE UR QL: NEGATIVE
MONOCYTES # BLD: 0.4 K/UL (ref 0–1)
MONOCYTES NFR BLD: 4 % (ref 5–13)
NEUTS SEG # BLD: 5.8 K/UL (ref 1.8–8)
NEUTS SEG NFR BLD: 64 % (ref 32–75)
NITRITE UR QL STRIP.AUTO: NEGATIVE
NRBC # BLD: 0 K/UL (ref 0–0.01)
NRBC BLD-RTO: 0 PER 100 WBC
OPIATES UR QL: NEGATIVE
P-R INTERVAL, ECG05: 188 MS
PCP UR QL: NEGATIVE
PH UR STRIP: 5 [PH] (ref 5–8)
PLATELET # BLD AUTO: 292 K/UL (ref 150–400)
PMV BLD AUTO: 11.9 FL (ref 8.9–12.9)
POTASSIUM SERPL-SCNC: 3.9 MMOL/L (ref 3.5–5.1)
PROT SERPL-MCNC: 7.4 G/DL (ref 6.4–8.2)
PROT UR STRIP-MCNC: NEGATIVE MG/DL
Q-T INTERVAL, ECG07: 370 MS
QRS DURATION, ECG06: 78 MS
QTC CALCULATION (BEZET), ECG08: 457 MS
RBC # BLD AUTO: 4.43 M/UL (ref 3.8–5.2)
RBC #/AREA URNS HPF: ABNORMAL /HPF (ref 0–5)
SODIUM SERPL-SCNC: 134 MMOL/L (ref 136–145)
SP GR UR REFRACTOMETRY: >1.03 (ref 1–1.03)
THERAPEUTIC RANGE,PTTT: NORMAL SEC (ref 82–109)
TROPONIN-HIGH SENSITIVITY: 237 NG/L (ref 0–51)
TROPONIN-HIGH SENSITIVITY: 266 NG/L (ref 0–51)
UA: UC IF INDICATED,UAUC: ABNORMAL
UROBILINOGEN UR QL STRIP.AUTO: 0.1 EU/DL (ref 0.1–1)
VENTRICULAR RATE, ECG03: 92 BPM
WBC # BLD AUTO: 8.9 K/UL (ref 3.6–11)
WBC URNS QL MICRO: ABNORMAL /HPF (ref 0–4)

## 2022-03-17 PROCEDURE — 74011250636 HC RX REV CODE- 250/636: Performed by: EMERGENCY MEDICINE

## 2022-03-17 PROCEDURE — 65270000029 HC RM PRIVATE

## 2022-03-17 PROCEDURE — 74011250637 HC RX REV CODE- 250/637: Performed by: EMERGENCY MEDICINE

## 2022-03-17 PROCEDURE — 99285 EMERGENCY DEPT VISIT HI MDM: CPT

## 2022-03-17 PROCEDURE — 96374 THER/PROPH/DIAG INJ IV PUSH: CPT

## 2022-03-17 PROCEDURE — 74011250636 HC RX REV CODE- 250/636: Performed by: INTERNAL MEDICINE

## 2022-03-17 PROCEDURE — 81025 URINE PREGNANCY TEST: CPT

## 2022-03-17 PROCEDURE — 93005 ELECTROCARDIOGRAM TRACING: CPT

## 2022-03-17 PROCEDURE — 85730 THROMBOPLASTIN TIME PARTIAL: CPT

## 2022-03-17 PROCEDURE — 74011250637 HC RX REV CODE- 250/637: Performed by: NURSE PRACTITIONER

## 2022-03-17 PROCEDURE — 71045 X-RAY EXAM CHEST 1 VIEW: CPT

## 2022-03-17 PROCEDURE — 84484 ASSAY OF TROPONIN QUANT: CPT

## 2022-03-17 PROCEDURE — 36415 COLL VENOUS BLD VENIPUNCTURE: CPT

## 2022-03-17 PROCEDURE — 81001 URINALYSIS AUTO W/SCOPE: CPT

## 2022-03-17 PROCEDURE — 85025 COMPLETE CBC W/AUTO DIFF WBC: CPT

## 2022-03-17 PROCEDURE — 96365 THER/PROPH/DIAG IV INF INIT: CPT

## 2022-03-17 PROCEDURE — 80053 COMPREHEN METABOLIC PANEL: CPT

## 2022-03-17 PROCEDURE — 80307 DRUG TEST PRSMV CHEM ANLYZR: CPT

## 2022-03-17 RX ORDER — ASPIRIN 325 MG
325 TABLET ORAL ONCE
Status: COMPLETED | OUTPATIENT
Start: 2022-03-17 | End: 2022-03-17

## 2022-03-17 RX ORDER — HEPARIN SODIUM 1000 [USP'U]/ML
2000 INJECTION, SOLUTION INTRAVENOUS; SUBCUTANEOUS AS NEEDED
Status: DISCONTINUED | OUTPATIENT
Start: 2022-03-17 | End: 2022-03-17

## 2022-03-17 RX ORDER — ENOXAPARIN SODIUM 100 MG/ML
60 INJECTION SUBCUTANEOUS ONCE
Status: COMPLETED | OUTPATIENT
Start: 2022-03-17 | End: 2022-03-17

## 2022-03-17 RX ORDER — HEPARIN SODIUM 1000 [USP'U]/ML
4000 INJECTION, SOLUTION INTRAVENOUS; SUBCUTANEOUS ONCE
Status: COMPLETED | OUTPATIENT
Start: 2022-03-17 | End: 2022-03-17

## 2022-03-17 RX ORDER — HEPARIN SODIUM 10000 [USP'U]/100ML
12-25 INJECTION, SOLUTION INTRAVENOUS
Status: DISCONTINUED | OUTPATIENT
Start: 2022-03-17 | End: 2022-03-17

## 2022-03-17 RX ORDER — HEPARIN SODIUM 1000 [USP'U]/ML
4000 INJECTION, SOLUTION INTRAVENOUS; SUBCUTANEOUS AS NEEDED
Status: DISCONTINUED | OUTPATIENT
Start: 2022-03-17 | End: 2022-03-17

## 2022-03-17 RX ORDER — ENOXAPARIN SODIUM 100 MG/ML
40 INJECTION SUBCUTANEOUS ONCE
Status: DISCONTINUED | OUTPATIENT
Start: 2022-03-17 | End: 2022-03-17

## 2022-03-17 RX ADMIN — ASPIRIN 325 MG ORAL TABLET 325 MG: 325 PILL ORAL at 17:44

## 2022-03-17 RX ADMIN — ENOXAPARIN SODIUM 60 MG: 100 INJECTION SUBCUTANEOUS at 23:46

## 2022-03-17 RX ADMIN — Medication 12 UNITS/KG/HR: at 17:45

## 2022-03-17 RX ADMIN — HEPARIN SODIUM 4000 UNITS: 1000 INJECTION, SOLUTION INTRAVENOUS; SUBCUTANEOUS at 17:44

## 2022-03-17 RX ADMIN — TICAGRELOR 180 MG: 90 TABLET ORAL at 18:54

## 2022-03-17 NOTE — ED TRIAGE NOTES
Presents by ems  from cardiology office (Dr Jillian Sharp)  For unstable angina. N/v times one week.

## 2022-03-17 NOTE — CONSULTS
CONSULTATION    REASON FOR CONSULT:  Chest pain    REQUESTING PROVIDER:  See chart    CHIEF COMPLAINT:    Chief Complaint   Patient presents with    Chest Pain    Nausea    Vomiting         HISTORY OF PRESENT ILLNESS:  Yaima Worley is a 43y.o. year-old female with past medical history significant for hypertension and diabetes mellitus who presented to ED for evaluation of chest pain. Patient was seen earlier today in the office for a problem visit. At that time she was complaining of chest pain at rest and worse with exertion. Nuclear medicine stress test last week showed moderate anterior septal ischemia, with a preserved EF. On examination, she does not appear to be in any acute distress. She states that her chest pain has improved. Started on heparin gtt and given Brilinta 180 mg, and will start 90 mg PO BID starting tomorrow. HS troponin 266, EKG without ischemic changes. Discussed the risks and benefits of a cardiac catheterization with the patient and she agrees to proceed with procedure. Written consent obtained. NPO at midnight. Records from hospital admission course thus far reviewed.       PAST MEDICAL HISTORY:    Past Medical History:   Diagnosis Date    Diabetes insipidus ([facility] [address]     Hypertension        PAST SURGICAL HISTORY:   Past Surgical History:   Procedure Laterality Date    NM EXTRAC ERUPTED TOOTH/EXPOSED ROOT         ALLERGIES:    Allergies   Allergen Reactions    Ammonium Lactate Unknown (comments)     Burning sensation    Azithromycin Rash    Buspirone Rash    Clindamycin Rash    Codeine Unknown (comments)     Feels\"out of it\"    Cold Relief [Cpm-Phenyleph-Acetaminophen] Unknown (comments)     Lip swelling    Doxycycline Itching    Fenofibrate Unknown (comments)    Gabapentin Itching    Glimepiride Unknown (comments)     Tremors    Glipizide Unknown (comments)     Tremors    Januvia [Sitagliptin] Unknown (comments)     Rash    Lantus Solostar U-100 Insulin [Insulin Glargine] Unknown (comments)    Metformin Diarrhea    Metronidazole Unknown (comments)    Omeprazole Unknown (comments)    Penicillins Hives    Promethazine Unknown (comments)     Tachycardia    Tramadol Unknown (comments)     Tachycardia      Zanaflex [Tizanidine] Unknown (comments)     Throat and tongue burning and numbness    Ziprasidone Unknown (comments)    Zyrtec [Cetirizine] Unknown (comments)       FAMILY HISTORY:    Family History   Problem Relation Age of Onset    Diabetes Mother     Hypertension Mother     Hypertension Father        SOCIAL HISTORY:    Tobacco: Never smoker  Drugs: No  ETOH: No     HOME MEDICATIONS:    Prior to Admission Medications   Prescriptions Last Dose Informant Patient Reported? Taking? BD Radha 2nd Gen Pen Needle 32 gauge x 5/32\" ndle   Yes No   Sig: USE AS DIRECTED TWICE A DAY   HumaLOG Mix 75-25 KwikPen flexpen   No No   Sig: Inject 25 units twice a day before meals   Insulin Needles, Disposable, 31 gauge x 5/16\" ndle   No No   Sig: Use to take insulin twice a day   TRUEplus Lancets 28 gauge misc   No No   Sig: Use to check glucose 33 times a day   True Metrix Glucose Test Strip strip   No No   Sig: Use to check glucose 3 times a day   acetaminophen (TylenoL) 325 mg tablet   Yes No   Sig: Take  by mouth every four (4) hours as needed for Pain. lisinopriL (PRINIVIL, ZESTRIL) 2.5 mg tablet   Yes No   Sig: Take  by mouth daily. naproxen sodium (Aleve) 220 mg cap   Yes No   Sig: Take  by mouth.   triamcinolone acetonide 0.025 % lotion   Yes No   Sig: Apply  to affected area two (2) times a day. Facility-Administered Medications: None       REVIEW OF SYSTEMS:  Complete review of systems performed, pertinents noted above, all other systems are negative.     Patient Vitals for the past 24 hrs:   Temp Pulse Resp BP SpO2   03/17/22 1420 98.3 °F (36.8 °C) (!) 102 19 120/74 98 %       PHYSICAL EXAMINATION:    General: , NAD, A&O  HEENT: Normocephalic, PERRL, no drainage  Neck: Supple, Trachea midline, No JVD  RESP: CTA bilaterally. + Symmetrical chest movement. No SOB or distress. On room air  Cardiovascular: RRR no MRG  PVS: No rubor, cyanosis, no edema  ABD:  soft, NT, Normoactive BS  Derm: Warm/Dry/Intact with no lesions  Neuro: A&O PPTS,No focal deficits  PSYCH: No anxiety or agitation    Recent labs results and imaging reviewed. Recent Results (from the past 24 hour(s))   EKG, 12 LEAD, INITIAL    Collection Time: 03/17/22  2:24 PM   Result Value Ref Range    Ventricular Rate 92 BPM    Atrial Rate 92 BPM    P-R Interval 188 ms    QRS Duration 78 ms    Q-T Interval 370 ms    QTC Calculation (Bezet) 457 ms    Calculated P Axis 50 degrees    Calculated R Axis -5 degrees    Calculated T Axis 20 degrees    Diagnosis       Normal sinus rhythm  Minimal voltage criteria for LVH, may be normal variant  Septal infarct , age undetermined  Possible Lateral infarct , age undetermined  Abnormal ECG  No previous ECGs available  Confirmed by Rosemarie Polk MD, Sotero Cousin ([id number]) on 3/17/2022 4:56:33 PM     CBC WITH AUTOMATED DIFF    Collection Time: 03/17/22  2:43 PM   Result Value Ref Range    WBC 8.9 3.6 - 11.0 K/uL    RBC 4.43 3.80 - 5.20 M/uL    HGB 13.6 11.5 - 16.0 g/dL    HCT 38.3 35.0 - 47.0 %    MCV 86.5 80.0 - 99.0 FL    MCH 30.7 26.0 - 34.0 PG    MCHC 35.5 30.0 - 36.5 g/dL    RDW 11.2 (L) 11.5 - 14.5 %    PLATELET 115 403 - 947 K/uL    MPV 11.9 8.9 - 12.9 FL    NRBC 0.0 0.0  WBC    ABSOLUTE NRBC 0.00 0.00 - 0.01 K/uL    NEUTROPHILS 64 32 - 75 %    LYMPHOCYTES 30 12 - 49 %    MONOCYTES 4 (L) 5 - 13 %    EOSINOPHILS 1 0 - 7 %    BASOPHILS 1 0 - 1 %    IMMATURE GRANULOCYTES 0 0 - 0.5 %    ABS. NEUTROPHILS 5.8 1.8 - 8.0 K/UL    ABS. LYMPHOCYTES 2.7 0.8 - 3.5 K/UL    ABS. MONOCYTES 0.4 0.0 - 1.0 K/UL    ABS. EOSINOPHILS 0.1 0.0 - 0.4 K/UL    ABS. BASOPHILS 0.1 0.0 - 0.1 K/UL    ABS. IMM.  GRANS. 0.0 0.00 - 0.04 K/UL    DF AUTOMATED     METABOLIC PANEL, COMPREHENSIVE    Collection Time: 03/17/22  2:43 PM   Result Value Ref Range    Sodium 134 (L) 136 - 145 mmol/L    Potassium 3.9 3.5 - 5.1 mmol/L    Chloride 104 97 - 108 mmol/L    CO2 25 21 - 32 mmol/L    Anion gap 5 5 - 15 mmol/L    Glucose 321 (H) 65 - 100 mg/dL    BUN 8 6 - 20 mg/dL    Creatinine 0.66 0.55 - 1.02 mg/dL    BUN/Creatinine ratio 12 12 - 20      GFR est AA >60 >60 ml/min/1.73m2    GFR est non-AA >60 >60 ml/min/1.73m2    Calcium 9.2 8.5 - 10.1 mg/dL    Bilirubin, total 0.5 0.2 - 1.0 mg/dL    AST (SGOT) 12 (L) 15 - 37 U/L    ALT (SGPT) 22 12 - 78 U/L    Alk. phosphatase 105 45 - 117 U/L    Protein, total 7.4 6.4 - 8.2 g/dL    Albumin 3.9 3.5 - 5.0 g/dL    Globulin 3.5 2.0 - 4.0 g/dL    A-G Ratio 1.1 1.1 - 2.2     URINALYSIS W/ REFLEX CULTURE    Collection Time: 03/17/22  2:43 PM    Specimen: Urine   Result Value Ref Range    Color Yellow/Straw      Appearance Clear Clear      Specific gravity >1.030 (H) 1.003 - 1.030    pH (UA) 5.0 5.0 - 8.0      Protein Negative Negative mg/dL    Glucose >300 (A) Negative mg/dL    Ketone 20 (A) Negative mg/dL    Bilirubin Negative Negative      Blood Negative Negative      Urobilinogen 0.1 0.1 - 1.0 EU/dL    Nitrites Negative Negative      Leukocyte Esterase Negative Negative      UA:UC IF INDICATED Culture not indicated by UA result Culture not indicated by UA result      WBC 0-4 0 - 4 /hpf    RBC 0-5 0 - 5 /hpf    Bacteria Negative Negative /hpf   HCG URINE, QL    Collection Time: 03/17/22  2:43 PM   Result Value Ref Range    HCG urine, QL Negative Negative     TROPONIN-HIGH SENSITIVITY    Collection Time: 03/17/22  2:43 PM   Result Value Ref Range    Troponin-High Sensitivity 266 (HH) 0 - 51 ng/L       XR Results (maximum last 3): Results from [place] encounter on 03/17/22    XR CHEST PORT    Impression  No acute findings.       Results from [place] encounter on 12/30/21    XR TIB/FIB LT      XR KNEE LT MAX 2 VWS          Current Facility-Administered Medications:    aspirin tablet 325 mg, 325 mg, Oral, ONCE, Janet Esposito MD    heparin (porcine) 1,000 unit/mL injection 4,000 Units, 4,000 Units, IntraVENous, ONCE, Janet Andrew MD    heparin 25,000 units in D5W 250 ml infusion, 12-25 Units/kg/hr (Adjusted), IntraVENous, TITRATE, Janet Andrew MD    heparin (porcine) 1,000 unit/mL injection 4,000 Units, 4,000 Units, IntraVENous, PRN **OR** heparin (porcine) 1,000 unit/mL injection 2,000 Units, 2,000 Units, IntraVENous, PRN, Salma Bettencourt MD    Current Outpatient Medications:     BD Radha 2nd Gen Pen Needle 32 gauge x 5/32\" ndle, USE AS DIRECTED TWICE A DAY, Disp: , Rfl:     Insulin Needles, Disposable, 31 gauge x 5/16\" ndle, Use to take insulin twice a day, Disp: 1 Package, Rfl: 5    HumaLOG Mix 75-25 KwikPen flexpen, Inject 25 units twice a day before meals, Disp: 5 Adjustable Dose Pre-filled Pen Syringe, Rfl: 3    True Metrix Glucose Test Strip strip, Use to check glucose 3 times a day, Disp: 100 Strip, Rfl: 3    TRUEplus Lancets 28 gauge misc, Use to check glucose 33 times a day, Disp: 100 Lancet, Rfl: 3    triamcinolone acetonide 0.025 % lotion, Apply  to affected area two (2) times a day., Disp: , Rfl:     lisinopriL (PRINIVIL, ZESTRIL) 2.5 mg tablet, Take  by mouth daily. , Disp: , Rfl:     acetaminophen (TylenoL) 325 mg tablet, Take  by mouth every four (4) hours as needed for Pain., Disp: , Rfl:     naproxen sodium (Aleve) 220 mg cap, Take  by mouth., Disp: , Rfl:       Case discussed with collaborating physician Dr. Juvenal Fox and our impression and recommendations are as follows:     1. Chest pain:   - HS Troponins 266, will continue to trend. - EKGs is nonischemic.   -  3/14/22 echo showed EF 64% no wall motion abnormalities. - 3/14/22 NMST showed a moderate anterior, septal defect, reversible  - discussed the risks and benefits of a cardiac catheterization with the patient and she has agreed to proceed with the procedure.  Written consent obtained. NPO at midnight.    - Loaded with Brilinta 180 mg, and Brilinta 90 mg BID starting tomorrow  - continue heparin gtt    2. Hypertension:  - blood pressure at goal  - continue to monitor. Thank you for involving us in the care of this patient. Please do not hesitate to call if additional questions arise. If after hours please call [phone].

## 2022-03-17 NOTE — Clinical Note
TRANSFER - OUT REPORT:     Verbal report given to: [facility]. Report consisted of patient's Situation, Background, Assessment and   Recommendations(SBAR). Opportunity for questions and clarification was provided. Patient transported with a Registered Nurse.

## 2022-03-17 NOTE — ED PROVIDER NOTES
EMERGENCY DEPARTMENT HISTORY AND PHYSICAL EXAM      Date: 3/17/2022  Patient Name: Adolph Can      History of Presenting Illness     Chief Complaint   Patient presents with    Chest Pain    Nausea    Vomiting       History Provided By: Patient    HPI: Adolph Can, 43 y.o. female with a past medical history significant hypertension and diabetes presents to the ED with cc of chest pain and discomfort on and off for the past couple of days with associated nausea and vomiting. She was seen by cardiology who said she was having an arrhythmia and she should come to the emergency department. She denies any fever, chills, rash, shortness of breath, headache, night sweats. Is not treated with anything. There are no other complaints, changes, or physical findings at this time.     PCP: BINU Mcdonald    Current Facility-Administered Medications   Medication Dose Route Frequency Provider Last Rate Last Admin    heparin 25,000 units in D5W 250 ml infusion  12-25 Units/kg/hr (Adjusted) IntraVENous TITRATE Kiara Hernandez MD 8.1 mL/hr at 03/17/22 1745 12 Units/kg/hr at 03/17/22 1745    heparin (porcine) 1,000 unit/mL injection 4,000 Units  4,000 Units IntraVENous PRN Kiara Hernandez MD        Or    heparin (porcine) 1,000 unit/mL injection 2,000 Units  2,000 Units IntraVENous PRN Kiara Hernandez MD        ticagrelor [facility]) tablet 180 mg  180 mg Oral NOW Pk Valdez NP       Isaiah Solders [START ON 3/18/2022] ticagrelor (BRILINTA) tablet 90 mg  90 mg Oral Q12H Pk Valdez NP         Current Outpatient Medications   Medication Sig Dispense Refill    BD Radha 2nd Gen Pen Needle 32 gauge x 5/32\" ndle USE AS DIRECTED TWICE A DAY      Insulin Needles, Disposable, 31 gauge x 5/16\" ndle Use to take insulin twice a day 1 Package 5    HumaLOG Mix 75-25 KwikPen flexpen Inject 25 units twice a day before meals 5 Adjustable Dose Pre-filled Pen Syringe 3    True Metrix Glucose Test Strip strip Use to check glucose 3 times a day 100 Strip 3    TRUEplus Lancets 28 gauge misc Use to check glucose 33 times a day 100 Lancet 3    triamcinolone acetonide 0.025 % lotion Apply  to affected area two (2) times a day.  lisinopriL (PRINIVIL, ZESTRIL) 2.5 mg tablet Take  by mouth daily.  acetaminophen (TylenoL) 325 mg tablet Take  by mouth every four (4) hours as needed for Pain.  naproxen sodium (Aleve) 220 mg cap Take  by mouth. Past History     Past Medical History:  Past Medical History:   Diagnosis Date    Diabetes insipidus ([address]     Hypertension        Past Surgical History:  Past Surgical History:   Procedure Laterality Date    [place] EXTRAC ERUPTED TOOTH/EXPOSED ROOT         Family History:  Family History   Problem Relation Age of Onset    Diabetes Mother    Milagrobecca Hinders Hypertension Mother     Hypertension Father        Social History:  Social History     Tobacco Use    Smoking status: Never Smoker    Smokeless tobacco: Never Used   Vaping Use    Vaping Use: Never used   Substance Use Topics    Alcohol use: Not Currently    Drug use: Never       Allergies:   Allergies   Allergen Reactions    Ammonium Lactate Unknown (comments)     Burning sensation    Azithromycin Rash    Buspirone Rash    Clindamycin Rash    Codeine Unknown (comments)     Feels\"out of it\"    Cold Relief [Cpm-Phenyleph-Acetaminophen] Unknown (comments)     Lip swelling    Doxycycline Itching    Fenofibrate Unknown (comments)    Gabapentin Itching    Glimepiride Unknown (comments)     Tremors    Glipizide Unknown (comments)     Tremors    Januvia [Sitagliptin] Unknown (comments)     Rash    Lantus Solostar U-100 Insulin [Insulin Glargine] Unknown (comments)    Metformin Diarrhea    Metronidazole Unknown (comments)    Omeprazole Unknown (comments)    Penicillins Hives    Promethazine Unknown (comments)     Tachycardia    Tramadol Unknown (comments)     Tachycardia      Zanaflex [Tizanidine] Unknown (comments) Throat and tongue burning and numbness    Ziprasidone Unknown (comments)    Zyrtec [Cetirizine] Unknown (comments)         Review of Systems     Review of Systems   Constitutional: Negative. Negative for appetite change, chills, fatigue and fever. HENT: Negative. Negative for congestion and sinus pain. Eyes: Negative. Negative for pain and visual disturbance. Respiratory: Positive for chest tightness. Negative for shortness of breath. Cardiovascular: Positive for chest pain. Gastrointestinal: Negative. Negative for abdominal pain, diarrhea, nausea and vomiting. Genitourinary: Negative. Negative for difficulty urinating. No discharge   Musculoskeletal: Negative. Negative for arthralgias. Skin: Negative. Negative for rash. Neurological: Negative. Negative for weakness and headaches. Hematological: Negative. Psychiatric/Behavioral: Negative. Negative for agitation. The patient is not nervous/anxious. All other systems reviewed and are negative. Physical Exam     Physical Exam  Vitals and nursing note reviewed. Constitutional:       General: She is not in acute distress. Appearance: She is well-developed. HENT:      Head: Normocephalic and atraumatic. Nose: Nose normal.      Mouth/Throat:      Mouth: Mucous membranes are moist.      Pharynx: Oropharynx is clear. No oropharyngeal exudate. Eyes:      General:         Right eye: No discharge. Left eye: No discharge. Conjunctiva/sclera: Conjunctivae normal.      Pupils: Pupils are equal, round, and reactive to light. Cardiovascular:      Rate and Rhythm: Normal rate and regular rhythm. Chest Wall: PMI is not displaced. No thrill. Heart sounds: Normal heart sounds. No murmur heard. No friction rub. No gallop. Pulmonary:      Effort: Pulmonary effort is normal. No respiratory distress. Breath sounds: Normal breath sounds. No wheezing or rales.    Chest:      Chest wall: No tenderness. Abdominal:      General: Bowel sounds are normal. There is no distension. Palpations: Abdomen is soft. There is no mass. Tenderness: There is no abdominal tenderness. There is no guarding or rebound. Musculoskeletal:         General: Normal range of motion. Cervical back: Normal range of motion and neck supple. Lymphadenopathy:      Cervical: No cervical adenopathy. Skin:     General: Skin is warm and dry. Capillary Refill: Capillary refill takes less than 2 seconds. Findings: No erythema or rash. Neurological:      Mental Status: She is alert and oriented to person, place, and time. Cranial Nerves: No cranial nerve deficit.       Coordination: Coordination normal.   Psychiatric:         Mood and Affect: Mood normal.         Behavior: Behavior normal.         Lab and Diagnostic Study Results     Labs -     Recent Results (from the past 12 hour(s))   EKG, 12 LEAD, INITIAL    Collection Time: 03/17/22  2:24 PM   Result Value Ref Range    Ventricular Rate 92 BPM    Atrial Rate 92 BPM    P-R Interval 188 ms    QRS Duration 78 ms    Q-T Interval 370 ms    QTC Calculation (Bezet) 457 ms    Calculated P Axis 50 degrees    Calculated R Axis -5 degrees    Calculated T Axis 20 degrees    Diagnosis       Normal sinus rhythm  Minimal voltage criteria for LVH, may be normal variant  Septal infarct , age undetermined  Possible Lateral infarct , age undetermined  Abnormal ECG  No previous ECGs available  Confirmed by [facility] Spencer SON ([id number]) on 3/17/2022 4:56:33 PM     CBC WITH AUTOMATED DIFF    Collection Time: 03/17/22  2:43 PM   Result Value Ref Range    WBC 8.9 3.6 - 11.0 K/uL    RBC 4.43 3.80 - 5.20 M/uL    HGB 13.6 11.5 - 16.0 g/dL    HCT 38.3 35.0 - 47.0 %    MCV 86.5 80.0 - 99.0 FL    MCH 30.7 26.0 - 34.0 PG    MCHC 35.5 30.0 - 36.5 g/dL    RDW 11.2 (L) 11.5 - 14.5 %    PLATELET 680 794 - 718 K/uL    MPV 11.9 8.9 - 12.9 FL    NRBC 0.0 0.0  WBC    ABSOLUTE NRBC 0.00 0.00 - 0.01 K/uL    NEUTROPHILS 64 32 - 75 %    LYMPHOCYTES 30 12 - 49 %    MONOCYTES 4 (L) 5 - 13 %    EOSINOPHILS 1 0 - 7 %    BASOPHILS 1 0 - 1 %    IMMATURE GRANULOCYTES 0 0 - 0.5 %    ABS. NEUTROPHILS 5.8 1.8 - 8.0 K/UL    ABS. LYMPHOCYTES 2.7 0.8 - 3.5 K/UL    ABS. MONOCYTES 0.4 0.0 - 1.0 K/UL    ABS. EOSINOPHILS 0.1 0.0 - 0.4 K/UL    ABS. BASOPHILS 0.1 0.0 - 0.1 K/UL    ABS. IMM. GRANS. 0.0 0.00 - 0.04 K/UL    DF AUTOMATED     METABOLIC PANEL, COMPREHENSIVE    Collection Time: 03/17/22  2:43 PM   Result Value Ref Range    Sodium 134 (L) 136 - 145 mmol/L    Potassium 3.9 3.5 - 5.1 mmol/L    Chloride 104 97 - 108 mmol/L    CO2 25 21 - 32 mmol/L    Anion gap 5 5 - 15 mmol/L    Glucose 321 (H) 65 - 100 mg/dL    BUN 8 6 - 20 mg/dL    Creatinine 0.66 0.55 - 1.02 mg/dL    BUN/Creatinine ratio 12 12 - 20      GFR est AA >60 >60 ml/min/1.73m2    GFR est non-AA >60 >60 ml/min/1.73m2    Calcium 9.2 8.5 - 10.1 mg/dL    Bilirubin, total 0.5 0.2 - 1.0 mg/dL    AST (SGOT) 12 (L) 15 - 37 U/L    ALT (SGPT) 22 12 - 78 U/L    Alk.  phosphatase 105 45 - 117 U/L    Protein, total 7.4 6.4 - 8.2 g/dL    Albumin 3.9 3.5 - 5.0 g/dL    Globulin 3.5 2.0 - 4.0 g/dL    A-G Ratio 1.1 1.1 - 2.2     URINALYSIS W/ REFLEX CULTURE    Collection Time: 03/17/22  2:43 PM    Specimen: Urine   Result Value Ref Range    Color Yellow/Straw      Appearance Clear Clear      Specific gravity >1.030 (H) 1.003 - 1.030    pH (UA) 5.0 5.0 - 8.0      Protein Negative Negative mg/dL    Glucose >300 (A) Negative mg/dL    Ketone 20 (A) Negative mg/dL    Bilirubin Negative Negative      Blood Negative Negative      Urobilinogen 0.1 0.1 - 1.0 EU/dL    Nitrites Negative Negative      Leukocyte Esterase Negative Negative      UA:UC IF INDICATED Culture not indicated by UA result Culture not indicated by UA result      WBC 0-4 0 - 4 /hpf    RBC 0-5 0 - 5 /hpf    Bacteria Negative Negative /hpf   HCG URINE, QL    Collection Time: 03/17/22  2:43 PM   Result Value Ref Range    HCG urine, QL Negative Negative     TROPONIN-HIGH SENSITIVITY    Collection Time: 03/17/22  2:43 PM   Result Value Ref Range    Troponin-High Sensitivity 266 (HH) 0 - 51 ng/L       Radiologic Studies -   [unfilled]  CT Results  (Last 48 hours)    None        CXR Results  (Last 48 hours)               03/17/22 1526  XR CHEST PORT Final result    Impression:  No acute findings. Narrative:  Portable upright radiograph of the chest 3:09 p.m. no prior study. INDICATION: Chest pain. Low lung volumes. Heart size is within normal limits. No gross infiltrate or   effusion. Bones appear intact. No pneumothorax. Medical Decision Making and ED Course   - I am the first and primary provider for this patient AND AM THE PRIMARY PROVIDER OF RECORD. - I reviewed the vital signs, available nursing notes, past medical history, past surgical history, family history and social history. - Initial assessment performed. The patients presenting problems have been discussed, and the staff are in agreement with the care plan formulated and outlined with them. I have encouraged them to ask questions as they arise throughout their visit. Vital Signs-Reviewed the patient's vital signs. Patient Vitals for the past 12 hrs:   Temp Pulse Resp BP SpO2   03/17/22 1420 98.3 °F (36.8 °C) (!) 102 19 120/74 98 %     Assess with basic cardiac labs EKG and chest x-ray. ED Course:              Provider Notes (Medical Decision Making):   26-year-old female with history of risk factors for ACS presents after having an abnormal stress test and has a bump troponin in the emergency room. Will admit her to the hospital for cath in the morning.   MDM           Consultations:       Consultations: - NONE        Procedures and Critical Care       Performed by: Dileep Smith MD  PROCEDURES:  Procedures               CRITICAL CARE NOTE :  4:06 PM  Amount of Critical Care Time: 45(minutes)    IMPENDING DETERIORATION -Cardiovascular  ASSOCIATED RISK FACTORS - Dysrhythmia  MANAGEMENT- Bedside Assessment and Supervision of Care  INTERPRETATION -  Blood Pressure  INTERVENTIONS - hemodynamic mngmt  CASE REVIEW - Hospitalist/Intensivist  TREATMENT RESPONSE -Stable  PERFORMED BY - Self    NOTES   :  I have spent critical care time involved in lab review, consultations with specialist, family decision- making, bedside attention and documentation. This time excludes time spent in any separate billed procedures. During this entire length of time I was immediately available to the patient . Gurpreet Caputo MD        Disposition     Disposition: Condition stable    Admitted    Diagnosis     Clinical Impression:   1. ACS (acute coronary syndrome) ([facility] [address]        Attestations:    Gurpreet Caputo MD    Please note that this dictation was completed with [employer], the computer voice recognition software. Quite often unanticipated grammatical, syntax, homophones, and other interpretive errors are inadvertently transcribed by the computer software. Please disregard these errors. Please excuse any errors that have escaped final proofreading. Thank you.

## 2022-03-18 PROBLEM — M79.672 LEFT FOOT PAIN: Status: ACTIVE | Noted: 2020-12-02

## 2022-03-18 LAB
APTT PPP: 61.3 SEC (ref 21.2–34.1)
BASOPHILS # BLD: 0 K/UL (ref 0–0.1)
BASOPHILS NFR BLD: 1 % (ref 0–1)
DIFFERENTIAL METHOD BLD: ABNORMAL
EOSINOPHIL # BLD: 0 K/UL (ref 0–0.4)
EOSINOPHIL NFR BLD: 1 % (ref 0–7)
ERYTHROCYTE [DISTWIDTH] IN BLOOD BY AUTOMATED COUNT: 11.2 % (ref 11.5–14.5)
GLUCOSE BLD STRIP.AUTO-MCNC: 198 MG/DL (ref 65–117)
GLUCOSE BLD STRIP.AUTO-MCNC: 281 MG/DL (ref 65–117)
HCT VFR BLD AUTO: 38 % (ref 35–47)
HGB BLD-MCNC: 12.9 G/DL (ref 11.5–16)
IMM GRANULOCYTES # BLD AUTO: 0 K/UL (ref 0–0.04)
IMM GRANULOCYTES NFR BLD AUTO: 0 % (ref 0–0.5)
LYMPHOCYTES # BLD: 2.1 K/UL (ref 0.8–3.5)
LYMPHOCYTES NFR BLD: 27 % (ref 12–49)
MCH RBC QN AUTO: 29.7 PG (ref 26–34)
MCHC RBC AUTO-ENTMCNC: 33.9 G/DL (ref 30–36.5)
MCV RBC AUTO: 87.6 FL (ref 80–99)
MONOCYTES # BLD: 0.4 K/UL (ref 0–1)
MONOCYTES NFR BLD: 5 % (ref 5–13)
NEUTS SEG # BLD: 5.3 K/UL (ref 1.8–8)
NEUTS SEG NFR BLD: 66 % (ref 32–75)
NRBC # BLD: 0 K/UL (ref 0–0.01)
NRBC BLD-RTO: 0 PER 100 WBC
PERFORMED BY, TECHID: ABNORMAL
PERFORMED BY, TECHID: ABNORMAL
PLATELET # BLD AUTO: 253 K/UL (ref 150–400)
PMV BLD AUTO: 11.9 FL (ref 8.9–12.9)
RBC # BLD AUTO: 4.34 M/UL (ref 3.8–5.2)
THERAPEUTIC RANGE,PTTT: ABNORMAL SEC (ref 82–109)
WBC # BLD AUTO: 8 K/UL (ref 3.6–11)

## 2022-03-18 PROCEDURE — 80061 LIPID PANEL: CPT

## 2022-03-18 PROCEDURE — 82962 GLUCOSE BLOOD TEST: CPT

## 2022-03-18 PROCEDURE — 74011000636 HC RX REV CODE- 636: Performed by: STUDENT IN AN ORGANIZED HEALTH CARE EDUCATION/TRAINING PROGRAM

## 2022-03-18 PROCEDURE — 74011250637 HC RX REV CODE- 250/637: Performed by: NURSE PRACTITIONER

## 2022-03-18 PROCEDURE — 83036 HEMOGLOBIN GLYCOSYLATED A1C: CPT

## 2022-03-18 PROCEDURE — 77030040934 HC CATH DIAG DXTERITY MEDT -A: Performed by: STUDENT IN AN ORGANIZED HEALTH CARE EDUCATION/TRAINING PROGRAM

## 2022-03-18 PROCEDURE — 99152 MOD SED SAME PHYS/QHP 5/>YRS: CPT | Performed by: STUDENT IN AN ORGANIZED HEALTH CARE EDUCATION/TRAINING PROGRAM

## 2022-03-18 PROCEDURE — 74011000250 HC RX REV CODE- 250: Performed by: STUDENT IN AN ORGANIZED HEALTH CARE EDUCATION/TRAINING PROGRAM

## 2022-03-18 PROCEDURE — C1894 INTRO/SHEATH, NON-LASER: HCPCS | Performed by: STUDENT IN AN ORGANIZED HEALTH CARE EDUCATION/TRAINING PROGRAM

## 2022-03-18 PROCEDURE — 74011250637 HC RX REV CODE- 250/637: Performed by: STUDENT IN AN ORGANIZED HEALTH CARE EDUCATION/TRAINING PROGRAM

## 2022-03-18 PROCEDURE — B2111ZZ FLUOROSCOPY OF MULTIPLE CORONARY ARTERIES USING LOW OSMOLAR CONTRAST: ICD-10-PCS | Performed by: STUDENT IN AN ORGANIZED HEALTH CARE EDUCATION/TRAINING PROGRAM

## 2022-03-18 PROCEDURE — 85025 COMPLETE CBC W/AUTO DIFF WBC: CPT

## 2022-03-18 PROCEDURE — 74011636637 HC RX REV CODE- 636/637: Performed by: INTERNAL MEDICINE

## 2022-03-18 PROCEDURE — 36415 COLL VENOUS BLD VENIPUNCTURE: CPT

## 2022-03-18 PROCEDURE — 74011250636 HC RX REV CODE- 250/636: Performed by: STUDENT IN AN ORGANIZED HEALTH CARE EDUCATION/TRAINING PROGRAM

## 2022-03-18 PROCEDURE — 4A023N7 MEASUREMENT OF CARDIAC SAMPLING AND PRESSURE, LEFT HEART, PERCUTANEOUS APPROACH: ICD-10-PCS | Performed by: STUDENT IN AN ORGANIZED HEALTH CARE EDUCATION/TRAINING PROGRAM

## 2022-03-18 PROCEDURE — 93458 L HRT ARTERY/VENTRICLE ANGIO: CPT | Performed by: STUDENT IN AN ORGANIZED HEALTH CARE EDUCATION/TRAINING PROGRAM

## 2022-03-18 PROCEDURE — 76210000006 HC OR PH I REC 0.5 TO 1 HR: Performed by: STUDENT IN AN ORGANIZED HEALTH CARE EDUCATION/TRAINING PROGRAM

## 2022-03-18 PROCEDURE — 85730 THROMBOPLASTIN TIME PARTIAL: CPT

## 2022-03-18 PROCEDURE — C1769 GUIDE WIRE: HCPCS | Performed by: STUDENT IN AN ORGANIZED HEALTH CARE EDUCATION/TRAINING PROGRAM

## 2022-03-18 PROCEDURE — 65270000029 HC RM PRIVATE

## 2022-03-18 RX ORDER — GUAIFENESIN 100 MG/5ML
81 LIQUID (ML) ORAL DAILY
Status: DISCONTINUED | OUTPATIENT
Start: 2022-03-19 | End: 2022-03-21 | Stop reason: HOSPADM

## 2022-03-18 RX ORDER — ONDANSETRON 2 MG/ML
4 INJECTION INTRAMUSCULAR; INTRAVENOUS
Status: DISCONTINUED | OUTPATIENT
Start: 2022-03-18 | End: 2022-03-21 | Stop reason: HOSPADM

## 2022-03-18 RX ORDER — METOPROLOL TARTRATE 25 MG/1
12.5 TABLET, FILM COATED ORAL EVERY 12 HOURS
Status: DISCONTINUED | OUTPATIENT
Start: 2022-03-18 | End: 2022-03-21 | Stop reason: HOSPADM

## 2022-03-18 RX ORDER — LISINOPRIL 5 MG/1
5 TABLET ORAL DAILY
Status: DISCONTINUED | OUTPATIENT
Start: 2022-03-19 | End: 2022-03-21 | Stop reason: HOSPADM

## 2022-03-18 RX ORDER — INSULIN LISPRO 100 [IU]/ML
INJECTION, SOLUTION INTRAVENOUS; SUBCUTANEOUS
Status: DISCONTINUED | OUTPATIENT
Start: 2022-03-18 | End: 2022-03-21 | Stop reason: HOSPADM

## 2022-03-18 RX ORDER — HEPARIN SODIUM 200 [USP'U]/100ML
INJECTION, SOLUTION INTRAVENOUS
Status: COMPLETED | OUTPATIENT
Start: 2022-03-18 | End: 2022-03-18

## 2022-03-18 RX ORDER — HEPARIN SODIUM 1000 [USP'U]/ML
INJECTION, SOLUTION INTRAVENOUS; SUBCUTANEOUS AS NEEDED
Status: DISCONTINUED | OUTPATIENT
Start: 2022-03-18 | End: 2022-03-18 | Stop reason: HOSPADM

## 2022-03-18 RX ORDER — NITROGLYCERIN 5 MG/ML
INJECTION, SOLUTION INTRAVENOUS AS NEEDED
Status: DISCONTINUED | OUTPATIENT
Start: 2022-03-18 | End: 2022-03-18 | Stop reason: HOSPADM

## 2022-03-18 RX ORDER — HEPARIN SODIUM 10000 [USP'U]/100ML
12-25 INJECTION, SOLUTION INTRAVENOUS
Status: DISCONTINUED | OUTPATIENT
Start: 2022-03-18 | End: 2022-03-21 | Stop reason: HOSPADM

## 2022-03-18 RX ORDER — LIDOCAINE HYDROCHLORIDE 10 MG/ML
INJECTION INFILTRATION; PERINEURAL AS NEEDED
Status: DISCONTINUED | OUTPATIENT
Start: 2022-03-18 | End: 2022-03-18 | Stop reason: HOSPADM

## 2022-03-18 RX ORDER — SODIUM CHLORIDE 0.9 % (FLUSH) 0.9 %
5-40 SYRINGE (ML) INJECTION AS NEEDED
Status: DISCONTINUED | OUTPATIENT
Start: 2022-03-18 | End: 2022-03-21 | Stop reason: HOSPADM

## 2022-03-18 RX ORDER — VERAPAMIL HYDROCHLORIDE 2.5 MG/ML
INJECTION, SOLUTION INTRAVENOUS AS NEEDED
Status: DISCONTINUED | OUTPATIENT
Start: 2022-03-18 | End: 2022-03-18 | Stop reason: HOSPADM

## 2022-03-18 RX ORDER — ATORVASTATIN CALCIUM 40 MG/1
40 TABLET, FILM COATED ORAL DAILY
Status: DISCONTINUED | OUTPATIENT
Start: 2022-03-19 | End: 2022-03-19

## 2022-03-18 RX ORDER — SODIUM CHLORIDE 0.9 % (FLUSH) 0.9 %
5-40 SYRINGE (ML) INJECTION EVERY 8 HOURS
Status: DISCONTINUED | OUTPATIENT
Start: 2022-03-18 | End: 2022-03-21 | Stop reason: SDUPTHER

## 2022-03-18 RX ORDER — MIDAZOLAM HYDROCHLORIDE 1 MG/ML
INJECTION INTRAMUSCULAR; INTRAVENOUS AS NEEDED
Status: DISCONTINUED | OUTPATIENT
Start: 2022-03-18 | End: 2022-03-18 | Stop reason: HOSPADM

## 2022-03-18 RX ORDER — FENTANYL CITRATE 50 UG/ML
INJECTION, SOLUTION INTRAMUSCULAR; INTRAVENOUS AS NEEDED
Status: DISCONTINUED | OUTPATIENT
Start: 2022-03-18 | End: 2022-03-18 | Stop reason: HOSPADM

## 2022-03-18 RX ADMIN — METOPROLOL TARTRATE 12.5 MG: 25 TABLET, FILM COATED ORAL at 21:20

## 2022-03-18 RX ADMIN — ONDANSETRON 4 MG: 2 INJECTION INTRAMUSCULAR; INTRAVENOUS at 11:08

## 2022-03-18 RX ADMIN — SODIUM CHLORIDE, PRESERVATIVE FREE 10 ML: 5 INJECTION INTRAVENOUS at 21:25

## 2022-03-18 RX ADMIN — Medication 12 UNITS/KG/HR: at 20:18

## 2022-03-18 RX ADMIN — INSULIN LISPRO 3 UNITS: 100 INJECTION, SOLUTION INTRAVENOUS; SUBCUTANEOUS at 21:20

## 2022-03-18 RX ADMIN — TICAGRELOR 90 MG: 90 TABLET ORAL at 12:16

## 2022-03-18 RX ADMIN — SODIUM CHLORIDE, PRESERVATIVE FREE 10 ML: 5 INJECTION INTRAVENOUS at 18:00

## 2022-03-18 NOTE — ED NOTES
Spoke with Cardiologist, Dr. Aurora Jason regarding pt decrease in troponin to 237. Per provider, discontinue the heparin drip. Please give a Lovenox 60mg SQ injection one time dose at 2300. Received call from Cardiologist Dr. Karolyn Garner as well. Updated him on the orders that were changed per Dr. Aurora Jason. Dr. Karolyn Garner agreed.

## 2022-03-18 NOTE — CONSULTS
[facility] CARDIOLOGY  Progress note    Subjective: Patient has very minor CP if resting. Had some N/V this AM.   No SOB. INPATIENT MEDICATIONS:  Home medications reviewed.     Current Facility-Administered Medications:     ondansetron (ZOFRAN) injection 4 mg, 4 mg, IntraVENous, Q6H PRN, Zuri Saunders MD, 4 mg at 03/18/22 1108    heparin 25,000 units in D5W 250 ml infusion, 12-25 Units/kg/hr, IntraVENous, TITRATE, MD Efrem Sylvester  [START ON 3/19/2022] aspirin chewable tablet 81 mg, 81 mg, Oral, DAILY, MD Efrem Sylvester  [START ON 3/19/2022] atorvastatin (LIPITOR) tablet 40 mg, 40 mg, Oral, DAILY, Zuri Saunders MD    insulin lispro (HUMALOG) injection, , SubCUTAneous, AC&HS, John Antony MD [employer]  Sophia De La Vega ON 3/19/2022] lisinopriL (PRINIVIL, ZESTRIL) tablet 5 mg, 5 mg, Oral, DAILY, John Antony MD    sodium chloride (NS) flush 5-40 mL, 5-40 mL, IntraVENous, Q8H, Zuri Saunders MD    sodium chloride (NS) flush 5-40 mL, 5-40 mL, IntraVENous, PRN, Zuri Saunders MD     ALLERGIES:  Allergies reviewed with the patient,  Allergies   Allergen Reactions    Ammonium Lactate Unknown (comments)     Burning sensation    Azithromycin Rash    Buspirone Rash    Clindamycin Rash    Codeine Unknown (comments)     Feels\"out of it\"    Cold Relief [Cpm-Phenyleph-Acetaminophen] Unknown (comments)     Lip swelling    Doxycycline Itching    Fenofibrate Unknown (comments)    Gabapentin Itching    Glimepiride Unknown (comments)     Tremors    Glipizide Unknown (comments)     Tremors    Januvia [Sitagliptin] Unknown (comments)     Rash    Lantus Solostar U-100 Insulin [Insulin Glargine] Unknown (comments)    Metformin Diarrhea    Metronidazole Unknown (comments)    Omeprazole Unknown (comments)    Penicillins Hives    Promethazine Unknown (comments)     Tachycardia    Tramadol Unknown (comments)     Tachycardia      Zanaflex [Tizanidine] Unknown (comments)     Throat and tongue burning and numbness    Ziprasidone Unknown (comments)    Zyrtec [Cetirizine] Unknown (comments)    . REVIEW OF SYSTEMS:  Complete review of systems performed, pertinents noted above, all other systems are negative. PHYSICAL EXAMINATION:  Cardiovascular exam has a heart with a RRR , normal S1 and S2. No murmur present. There are no rubs or gallops. Good peripheral pulses. No jugular venous distension. No carotid bruits are present. Respiratory exam reveals clear lung fields, no rales or rhonchi. Gastrointestinal exam has soft, nontender abdomen with normal bowel sounds. Lymphatic exam reveals no edema and no varicosities. No notable skin changes. Neurologic exam is nonfocal.  Musculoskeletal exam is notable for a normal gait. 1. Chest pain:   - HS Troponins 266, will continue to trend. - EKGs is nonischemic.   -  3/14/22 echo showed EF 64% no wall motion abnormalities. - 3/14/22 NMST showed a moderate anterior, septal defect, reversible  - Cath with 90% Ostial LAD stenosis, LCx with luminal irregularities, RCA with 70% proximal disease. Given she is very young and diabetic, she is best served with a Bypass surgery. I have spoken to Dr. Michela Christian at [facility] and [place] [id number] for transfer permission. They are able to accept the transfer Monday. Please perform a Hospitalist to Hospitalist transfer for Monday to [facility]  - I have stopped the Brilinta. Will need a washout before surgery. In meantime I started ASA, heparin gtt, and Atorvastatin. I will also start a low dose BB.      2. Hypertension:  - blood pressure at goal  - continue to monitor. Thank you for involving us in the care of this patient. Please do not hesitate to call if additional questions arise.       Cinthia Recinos MD  3/18/2022

## 2022-03-18 NOTE — PROGRESS NOTES
Removed 2 mL of air from TR band, removed band at approx 1745, qwik clot applied and tegaderm, patient tolerated well, no bleeding, no pain, reapplied wrist splint.

## 2022-03-18 NOTE — PROGRESS NOTES
CM alerted BIJAL Dooley to patient vomiting, no medications available, cardiologist on call paged.     Orders received from Dr. Vamshi Angeles for IV Zofran

## 2022-03-18 NOTE — PROGRESS NOTES
Patient arrived back from cardiac cath/PACU, A&O, no c/o pain, TRB intact, wrist immobilizer in place, TRB applied at 1546.

## 2022-03-18 NOTE — ED NOTES
Came into to patients room to initiate nurse patient relationship. Patient was crying and upset. Pt expressed concerns about current situations and cardiac issues. Pt stated she was seen at the cardiologist today and sent to the ED for an arrhythmia. Per patient, she was experience CP and tightness. Patient denies any SOB. Pt uses a Rolator for ambulation. Pt is alert and oriented x4. Pt is cooperative with care.

## 2022-03-18 NOTE — H&P
History & Physical    Primary Care Provider: BINU Gallego  Source of Information: Patient     History of Presenting Illness:   Meek Rivera is a 43 y.o. female admitted 03/17 for evaluation of chest pain and NSTEMI. She underwent cardiac catheterization today which showed multivessel disease. Known history of type 2 diabetes complicated by diabetic neuropathy, sleep apnea and essential hypertension. Patient seen and evaluated post cardiac cath, she denies chest pain or shortness of breath. No cough or productive sputum. Plans are for transfer to a higher level of care for evaluation of open heart surgery       Review of Systems:  A comprehensive review of systems was negative except for that written in the History of Present Illness. Past Medical History:   Diagnosis Date    Arthritis     OA    CAD (coronary artery disease)     recent abdnormal stress    Diabetes ([address]     [facility] insipidus [facility] [address] [facility]     Hypertension     Neuropathy     Sleep apnea     Hx of SA, doesnt use cpap    Tremor     per pt, tremor in head, caused her to lose voice in 2018, started speaking again in 2021, sees neurologist      Past Surgical History:   Procedure Laterality Date    HX CYST INCISION AND DRAINAGE      2021 on [address] [place] [address]       Prior to Admission medications    Medication Sig Start Date End Date Taking? Authorizing Provider   insulin lispro protamine/insulin lispro (HumaLOG Mix 50-50 Insuln U-100) 100 unit/mL (50-50) injection 20 Units by SubCUTAneous route Daily (before breakfast). Yes Provider, Historical   insulin lispro protamine/insulin lispro (HumaLOG Mix 50-50 Insuln U-100) 100 unit/mL (50-50) injection 24 Units by SubCUTAneous route daily (with dinner).    Yes Provider, Historical   BD Radha 2nd Gen Pen Needle 32 gauge x 5/32\" ndle USE AS DIRECTED TWICE A DAY 12/7/20   Provider, Historical   Insulin Needles, Disposable, 31 gauge x 5/16\" ndle Use to take insulin twice a day 3/5/21   Ta Trejo MD   HumaLOG Mix 75-25 KwikPen flexpen Inject 25 units twice a day before meals  Patient not taking: Reported on 3/17/2022 3/5/21   Ta Trejo MD   True Metrix Glucose Test Strip strip Use to check glucose 3 times a day 3/5/21   Ta Trejo MD   TRUEplus Lancets 28 gauge misc Use to check glucose 33 times a day 3/5/21   Ta Trejo MD   triamcinolone acetonide 0.025 % lotion Apply  to affected area as needed. Indications: inflammation of the skin due to an allergy    Provider, Historical   lisinopriL (PRINIVIL, ZESTRIL) 2.5 mg tablet Take  by mouth daily. Provider, Historical   acetaminophen (TylenoL) 325 mg tablet Take  by mouth every four (4) hours as needed for Pain. Provider, Historical   naproxen sodium (Aleve) 220 mg cap Take  by mouth.     Provider, Historical     Allergies   Allergen Reactions    Ammonium Lactate Unknown (comments)     Burning sensation    Azithromycin Rash    Buspirone Rash    Clindamycin Rash    Codeine Unknown (comments)     Feels\"out of it\"    Cold Relief [Cpm-Phenyleph-Acetaminophen] Unknown (comments)     Lip swelling    Doxycycline Itching    Fenofibrate Unknown (comments)    Gabapentin Itching    Glimepiride Unknown (comments)     Tremors    Glipizide Unknown (comments)     Tremors    Januvia [Sitagliptin] Unknown (comments)     Rash    Lantus Solostar U-100 Insulin [Insulin Glargine] Unknown (comments)    Metformin Diarrhea    Metronidazole Unknown (comments)    Omeprazole Unknown (comments)    Penicillins Hives    Promethazine Unknown (comments)     Tachycardia    Tramadol Unknown (comments)     Tachycardia      Zanaflex [Tizanidine] Unknown (comments)     Throat and tongue burning and numbness    Ziprasidone Unknown (comments)    Zyrtec [Cetirizine] Unknown (comments)      Family History   Problem Relation Age of Onset    Diabetes Mother     Hypertension Mother    [facility] Hypertension Father         SOCIAL HISTORY:  Patient resides:  Independently    Assisted Living    SNF    With family care x      Smoking history:   None x   Former    Chronic      Alcohol history:   None x   Social    Chronic      Ambulates:   Independently x   w/cane    w/walker    w/wc    CODE STATUS:  DNR    Full x   Other      Objective:     Physical Exam:     Visit Vitals  BP (!) 121/94   Pulse 79   Temp 98 °F (36.7 °C)   Resp 18   Ht 5' 4\" (1.626 m)   Wt 87.5 kg (193 lb)   SpO2 97%   Breastfeeding No   BMI 33.13 kg/m²      O2 Device: None (Room air)    General:  Alert, cooperative, no distress, appears stated age. Head:  Normocephalic, without obvious abnormality, atraumatic. Eyes:  Conjunctivae/corneas clear. PERRL, EOMs intact. Nose: Nares normal. Septum midline. Mucosa normal. No drainage or sinus tenderness. Throat: Lips, mucosa, and tongue normal. Teeth and gums normal.   Neck: Supple, symmetrical, trachea midline, no adenopathy, thyroid: no enlargement/tenderness/nodules, no carotid bruit and no JVD. Back:   Symmetric, no curvature. ROM normal. No CVA tenderness. Lungs:   Clear to auscultation bilaterally. Chest wall:  No tenderness or deformity. Heart:  Regular rate and rhythm, S1, S2 normal, no murmur, click, rub or gallop. Abdomen:   Soft, non-tender. Bowel sounds normal. No masses,  No organomegaly. Extremities: Extremities normal, atraumatic, no cyanosis or edema. Pulses: 2+ and symmetric all extremities. Skin: Skin color, texture, turgor normal. No rashes or lesions   Neurologic: CNII-XII intact. No motor or sensory deficits. EKG:  nonspecific ST and T waves changes.       Data Review:     Recent Days:  Recent Labs     03/17/22  1443   WBC 8.9   HGB 13.6   HCT 38.3        Recent Labs     03/17/22  1443   *   K 3.9      CO2 25   *   BUN 8   CREA 0.66   CA 9.2   ALB 3.9   TBILI 0.5   ALT 22     No results for input(s): PH, PCO2, PO2, HCO3, FIO2 in the last 72 hours. 24 Hour Results:  Recent Results (from the past 24 hour(s))   PTT    Collection Time: 03/17/22  5:40 PM   Result Value Ref Range    aPTT 29.9 21.2 - 34.1 sec    aPTT, therapeutic range   82 - 109 sec   TROPONIN-HIGH SENSITIVITY    Collection Time: 03/17/22  6:53 PM   Result Value Ref Range    Troponin-High Sensitivity 237 (HH) 0 - 51 ng/L   GLUCOSE, POC    Collection Time: 03/18/22  4:16 PM   Result Value Ref Range    Glucose (POC) 198 (H) 65 - 117 mg/dL    Performed by Dorinda Briscoe)          Imaging:   XR CHEST PORT   Final Result   No acute findings.             Assessment:     Non-ST elevation myocardial infarction  Multivessel coronary artery disease  Type 2 diabetes uncontrolled  Benign essential hypertension  Multiple medication allergy  Obstructive sleep apnea noncompliant with CPAP        Plan:     Continue medical management for acute coronary syndrome pending transfer to higher level of care  Add sliding scale insulin for better blood sugar control  Encourage use of CPAP at home  Obtain fasting lipid profile/A1c level  Anticipate transfer to higher level of care Monday  Case discussed with cardiologist,Dr Paulino Hou  She is full code      Signed By: Wally Lal MD     March 18, 2022

## 2022-03-18 NOTE — PROGRESS NOTES
Reason for Admission:  Chest pain                     RUR Score:          5%           Plan for utilizing home health:      Requesting at this time    PCP: First and Last name:  Sergio Moctezuma     Name of Practice:    Are you a current patient: Yes/No: yes Approximate date of last visit: last tuesday   Can you participate in a virtual visit with your PCP:                     Current Advanced Directive/Advance Care Plan: No Order      Healthcare Decision Maker:   Click here to complete [address] including selection of the Healthcare Decision Maker Relationship (ie \"Primary\")                             Transition of Care Plan:                      Patient lives with her mother in a first floor apartment. Patient has a rollator but no other DME. Patient has previously had HH but no SNF or IRF services. Patient states she drives at times, but her cousin will drive her home at discharge. Patient states she is able to cook for herself and perform her ADLs. Confirmed phone numbers and address with patient.

## 2022-03-18 NOTE — ROUTINE PROCESS
Pt transported to Room 478, reported giiven to RN assuming caare, informed that TR band can be removed beginning at 1700 site is c/d/i no hematoma no bleeding

## 2022-03-18 NOTE — ED NOTES
.. TRANSFER - OUT REPORT:    Verbal report given to [address] [place] [address] on Paralee Miguel  being transferred to [employer] room 478 for routine progression of care       Report consisted of patients Situation, Background, Assessment and   Recommendations(SBAR). Information from the following report(s) SBAR was reviewed with the receiving nurse. Lines:   Peripheral IV 03/17/22 Right Antecubital (Active)   Site Assessment Clean, dry, & intact 03/17/22 1442   Dressing Status Clean, dry, & intact 03/17/22 1442   Dressing Type Transparent 03/17/22 1442       Peripheral IV 03/17/22 Left Antecubital (Active)        Opportunity for questions and clarification was provided.       Patient transported with:   [employer]

## 2022-03-18 NOTE — ROUTINE PROCESS
TRANSFER - IN REPORT:    Verbal report received from Tiff, [facility] (name) on Paralee Miguel  being received from [facility](unit) for ordered procedure      Report consisted of patients Situation, Background, Assessment and   Recommendations(SBAR). Information from the following report(s) SBAR was reviewed with the receiving nurse. Opportunity for questions and clarification was provided. Assessment completed upon patients arrival to unit and care assumed.

## 2022-03-19 PROBLEM — E78.2 MIXED HYPERLIPIDEMIA: Status: ACTIVE | Noted: 2020-12-02

## 2022-03-19 PROBLEM — I10 BENIGN ESSENTIAL HTN: Status: ACTIVE | Noted: 2020-12-02

## 2022-03-19 PROBLEM — Z88.9 MULTIPLE DRUG ALLERGIES: Status: ACTIVE | Noted: 2020-12-02

## 2022-03-19 LAB
APTT PPP: 102.2 SEC (ref 21.2–34.1)
APTT PPP: 51.3 SEC (ref 21.2–34.1)
APTT PPP: 76.1 SEC (ref 21.2–34.1)
ATRIAL RATE: 84 BPM
CALCULATED P AXIS, ECG09: 44 DEGREES
CALCULATED R AXIS, ECG10: 17 DEGREES
CALCULATED T AXIS, ECG11: 25 DEGREES
CHOLEST SERPL-MCNC: 314 MG/DL
DIAGNOSIS, 93000: NORMAL
EST. AVERAGE GLUCOSE BLD GHB EST-MCNC: 298 MG/DL
GLUCOSE BLD STRIP.AUTO-MCNC: 224 MG/DL (ref 65–117)
GLUCOSE BLD STRIP.AUTO-MCNC: 250 MG/DL (ref 65–117)
GLUCOSE BLD STRIP.AUTO-MCNC: 278 MG/DL (ref 65–117)
GLUCOSE BLD STRIP.AUTO-MCNC: 297 MG/DL (ref 65–117)
HBA1C MFR BLD: 12 % (ref 4–5.6)
HDLC SERPL-MCNC: 47 MG/DL
HDLC SERPL: 6.7 {RATIO} (ref 0–5)
LDLC SERPL CALC-MCNC: 242.2 MG/DL (ref 0–100)
LIPID PROFILE,FLP: ABNORMAL
P-R INTERVAL, ECG05: 202 MS
PERFORMED BY, TECHID: ABNORMAL
Q-T INTERVAL, ECG07: 382 MS
QRS DURATION, ECG06: 82 MS
QTC CALCULATION (BEZET), ECG08: 451 MS
THERAPEUTIC RANGE,PTTT: ABNORMAL SEC (ref 82–109)
TRIGL SERPL-MCNC: 124 MG/DL (ref ?–150)
VENTRICULAR RATE, ECG03: 84 BPM
VLDLC SERPL CALC-MCNC: 24.8 MG/DL

## 2022-03-19 PROCEDURE — 93005 ELECTROCARDIOGRAM TRACING: CPT

## 2022-03-19 PROCEDURE — 74011000250 HC RX REV CODE- 250: Performed by: STUDENT IN AN ORGANIZED HEALTH CARE EDUCATION/TRAINING PROGRAM

## 2022-03-19 PROCEDURE — 65270000029 HC RM PRIVATE

## 2022-03-19 PROCEDURE — 74011250637 HC RX REV CODE- 250/637: Performed by: STUDENT IN AN ORGANIZED HEALTH CARE EDUCATION/TRAINING PROGRAM

## 2022-03-19 PROCEDURE — 36415 COLL VENOUS BLD VENIPUNCTURE: CPT

## 2022-03-19 PROCEDURE — 85730 THROMBOPLASTIN TIME PARTIAL: CPT

## 2022-03-19 PROCEDURE — 82962 GLUCOSE BLOOD TEST: CPT

## 2022-03-19 PROCEDURE — 74011250637 HC RX REV CODE- 250/637: Performed by: INTERNAL MEDICINE

## 2022-03-19 PROCEDURE — 74011250636 HC RX REV CODE- 250/636: Performed by: STUDENT IN AN ORGANIZED HEALTH CARE EDUCATION/TRAINING PROGRAM

## 2022-03-19 PROCEDURE — 74011250636 HC RX REV CODE- 250/636: Performed by: INTERNAL MEDICINE

## 2022-03-19 PROCEDURE — 74011636637 HC RX REV CODE- 636/637: Performed by: INTERNAL MEDICINE

## 2022-03-19 RX ORDER — ATORVASTATIN CALCIUM 40 MG/1
80 TABLET, FILM COATED ORAL DAILY
Status: DISCONTINUED | OUTPATIENT
Start: 2022-03-20 | End: 2022-03-21 | Stop reason: HOSPADM

## 2022-03-19 RX ORDER — DIPHENHYDRAMINE HCL 25 MG
25 CAPSULE ORAL
Status: DISCONTINUED | OUTPATIENT
Start: 2022-03-19 | End: 2022-03-20

## 2022-03-19 RX ORDER — MORPHINE SULFATE 2 MG/ML
2 INJECTION, SOLUTION INTRAMUSCULAR; INTRAVENOUS
Status: DISCONTINUED | OUTPATIENT
Start: 2022-03-19 | End: 2022-03-21 | Stop reason: HOSPADM

## 2022-03-19 RX ORDER — DEXTROSE MONOHYDRATE 100 MG/ML
0-250 INJECTION, SOLUTION INTRAVENOUS AS NEEDED
Status: DISCONTINUED | OUTPATIENT
Start: 2022-03-19 | End: 2022-03-21 | Stop reason: HOSPADM

## 2022-03-19 RX ORDER — INSULIN LISPRO 100 [IU]/ML
INJECTION, SOLUTION INTRAVENOUS; SUBCUTANEOUS
Status: DISCONTINUED | OUTPATIENT
Start: 2022-03-19 | End: 2022-03-19

## 2022-03-19 RX ORDER — FAMOTIDINE 20 MG/1
20 TABLET, FILM COATED ORAL EVERY 12 HOURS
Status: DISCONTINUED | OUTPATIENT
Start: 2022-03-19 | End: 2022-03-21 | Stop reason: HOSPADM

## 2022-03-19 RX ORDER — MAGNESIUM SULFATE 100 %
4 CRYSTALS MISCELLANEOUS AS NEEDED
Status: DISCONTINUED | OUTPATIENT
Start: 2022-03-19 | End: 2022-03-21 | Stop reason: HOSPADM

## 2022-03-19 RX ORDER — HEPARIN SODIUM 1000 [USP'U]/ML
4000 INJECTION, SOLUTION INTRAVENOUS; SUBCUTANEOUS AS NEEDED
Status: DISCONTINUED | OUTPATIENT
Start: 2022-03-19 | End: 2022-03-21 | Stop reason: HOSPADM

## 2022-03-19 RX ORDER — HEPARIN SODIUM 1000 [USP'U]/ML
2000 INJECTION, SOLUTION INTRAVENOUS; SUBCUTANEOUS AS NEEDED
Status: DISCONTINUED | OUTPATIENT
Start: 2022-03-19 | End: 2022-03-21 | Stop reason: HOSPADM

## 2022-03-19 RX ADMIN — SODIUM CHLORIDE, PRESERVATIVE FREE 10 ML: 5 INJECTION INTRAVENOUS at 22:25

## 2022-03-19 RX ADMIN — INSULIN LISPRO 3 UNITS: 100 INJECTION, SOLUTION INTRAVENOUS; SUBCUTANEOUS at 22:24

## 2022-03-19 RX ADMIN — FAMOTIDINE 20 MG: 20 TABLET, FILM COATED ORAL at 22:24

## 2022-03-19 RX ADMIN — Medication 16 UNITS/KG/HR: at 13:30

## 2022-03-19 RX ADMIN — METOPROLOL TARTRATE 12.5 MG: 25 TABLET, FILM COATED ORAL at 22:24

## 2022-03-19 RX ADMIN — INSULIN LISPRO 3 UNITS: 100 INJECTION, SOLUTION INTRAVENOUS; SUBCUTANEOUS at 08:26

## 2022-03-19 RX ADMIN — METOPROLOL TARTRATE 12.5 MG: 25 TABLET, FILM COATED ORAL at 08:25

## 2022-03-19 RX ADMIN — INSULIN LISPRO 5 UNITS: 100 INJECTION, SOLUTION INTRAVENOUS; SUBCUTANEOUS at 12:16

## 2022-03-19 RX ADMIN — SODIUM CHLORIDE, PRESERVATIVE FREE 10 ML: 5 INJECTION INTRAVENOUS at 06:25

## 2022-03-19 RX ADMIN — INSULIN LISPRO 5 UNITS: 100 INJECTION, SOLUTION INTRAVENOUS; SUBCUTANEOUS at 17:18

## 2022-03-19 RX ADMIN — HEPARIN SODIUM 2000 UNITS: 1000 INJECTION INTRAVENOUS; SUBCUTANEOUS at 04:36

## 2022-03-19 RX ADMIN — LISINOPRIL 5 MG: 5 TABLET ORAL at 08:25

## 2022-03-19 RX ADMIN — Medication 16 UNITS/KG/HR: at 17:31

## 2022-03-19 RX ADMIN — ASPIRIN 81 MG CHEWABLE TABLET 81 MG: 81 TABLET CHEWABLE at 08:25

## 2022-03-19 RX ADMIN — ATORVASTATIN CALCIUM 40 MG: 40 TABLET, FILM COATED ORAL at 08:25

## 2022-03-19 RX ADMIN — MORPHINE SULFATE 2 MG: 2 INJECTION, SOLUTION INTRAMUSCULAR; INTRAVENOUS at 22:24

## 2022-03-19 RX ADMIN — HEPARIN SODIUM 2000 UNITS: 1000 INJECTION INTRAVENOUS; SUBCUTANEOUS at 13:30

## 2022-03-19 RX ADMIN — SODIUM CHLORIDE, PRESERVATIVE FREE 10 ML: 5 INJECTION INTRAVENOUS at 13:30

## 2022-03-19 NOTE — PROGRESS NOTES
Problem: Falls - Risk of  Goal: *Absence of Falls  Description: Document Brynn Skill Fall Risk and appropriate interventions in the flowsheet.   Outcome: Progressing Towards Goal  Note: Fall Risk Interventions:  Mobility Interventions: OT consult for ADLs,PT Consult for mobility concerns,Utilize walker, cane, or other assistive device         Medication Interventions: Evaluate medications/consider consulting pharmacy,Patient to call before getting OOB                   Problem: Patient Education: Go to Patient Education Activity  Goal: Patient/Family Education  Outcome: Progressing Towards Goal

## 2022-03-19 NOTE — PROGRESS NOTES
[facility] CARDIOLOGY  Progress note    Patient seen and examined. We are following for chest pain. S/p cardiac catheterization, with 90% Ostial LAD stenosis, LCx with luminal irregularities, RCA with 70% proximal disease. Plans to transfer to [facility] for bypass surgery Monday 3/21/22. She is sitting, no acute distress. She endoreses mild chest pain. Continues on heparin drip. Denies dyspnea. PHYSICAL EXAMINATION:  Cardiovascular exam has a heart with a RRR, normal S1 and S2. No murmur present. There are no rubs or gallops. Good peripheral pulses. No jugular venous distension. No carotid bruits are present. Respiratory exam reveals clear lung fields, no rales or rhonchi. Gastrointestinal exam has soft, nontender abdomen with normal bowel sounds. Lymphatic exam reveals no edema and no varicosities. No notable skin changes. Neurologic exam is nonfocal.   Right radial access site tender, no hematoma noted, cap refill < 3 sec, strength and sensation intact. Echocardiogram 3/14/2022    Left Ventricle: Left ventricle size is normal. Mildly increased wall thickness. Normal wall motion. Normal left ventricular systolic function. EF by 2D Simpsons Biplane is 64%. Diastolic dysfunction present with normal LV EF. Tricuspid Valve: RVSP is 12 mmHg. Pericardium: Trivial pericardial effusion present. No indication of cardiac tamponade. Discussed case with Dr. Evangelina Cueva, our impressions and recommendations are as followed:     1. Chest pain:   - HS Troponins 266->237, will continue to trend. - EKGs is nonischemic.   -  3/14/22 echo showed EF 64% no wall motion abnormalities. - 3/14/22 NMST showed a moderate anterior, septal defect, reversible  - Cath with 90% Ostial LAD stenosis, LCx with luminal irregularities, RCA with 70% proximal disease. Given she is very young and diabetic (uncontrolled) A1c 12, she is best served with a Bypass surgery. Dr. Arpita Gustfason has spoken to Dr. Gene Montero at [facility] and [place] for transfer permission. They are able to accept the transfer Monday. Please perform a Hospitalist to Hospitalist transfer for Monday to [facility]  She is aware of plans and agreeable. - Brilinta discontinued. Will need a washout before surgery. Continue ASA, heparin gtt, metoprolol and Atorvastatin      2. Hypertension:  - blood pressure at goal  - continue to monitor. 3. Hyperlipidemia:  - , ,   - Continue statin therapy. Will consider Repatha in outpatient setting. Please do not hesitate to call if additional questions arise. Tyerl Lombardo, [address]  3/19/2022    GUNNAR Shahid Addendum:  Agree with findings and plan noted above. Needs bypass surgery, plan transfer to [facility] on Monday.

## 2022-03-19 NOTE — PROGRESS NOTES
Hospitalist Progress Note               Daily Progress Note: 3/19/2022    Per hpi:  Cathy Nguyen is a 43 y.o. female admitted 03/17 for evaluation of chest pain and NSTEMI. She underwent cardiac catheterization today which showed multivessel disease. Known history of type 2 diabetes complicated by diabetic neuropathy, sleep apnea and essential hypertension. Patient seen and evaluated post cardiac cath, she denies chest pain or shortness of breath. No cough or productive sputum. Plans are for transfer to a higher level of care for evaluation of open heart surgery  Subjective: The patient is seen for follow  up. Chart reviewed. She is sitting up at bedside, comfortable, no cp or sob. Earlier had some mid chest discomfort briefly,  She has known two-vessel disease per cath yesterday and we are awaiting transfer to [facility] on Monday morning for revascularization. Patient did talk a lot about of feeling full fast, occasionally vomiting with her meals and erratic blood sugars. She never been diagnosed with gastroparesis but we did talk about it and will get nutrition to see her to give her some recommendations in terms of an antireflux diet. She is stable hemodynamically and insulin drip, ACS protocol, continues.       Medications reviewed  Current Facility-Administered Medications   Medication Dose Route Frequency    heparin (porcine) 1,000 unit/mL injection 4,000 Units  4,000 Units IntraVENous PRN    Or    heparin (porcine) 1,000 unit/mL injection 2,000 Units  2,000 Units IntraVENous PRN    ondansetron (ZOFRAN) injection 4 mg  4 mg IntraVENous Q6H PRN    heparin 25,000 units in D5W 250 ml infusion  12-25 Units/kg/hr IntraVENous TITRATE    aspirin chewable tablet 81 mg  81 mg Oral DAILY    atorvastatin (LIPITOR) tablet 40 mg  40 mg Oral DAILY    insulin lispro (HUMALOG) injection   SubCUTAneous AC&HS    lisinopriL (PRINIVIL, ZESTRIL) tablet 5 mg  5 mg Oral DAILY    sodium chloride (NS) flush 5-40 mL  5-40 mL IntraVENous Q8H    sodium chloride (NS) flush 5-40 mL  5-40 mL IntraVENous PRN    metoprolol tartrate (LOPRESSOR) tablet 12.5 mg  12.5 mg Oral Q12H       Review of Systems:   A comprehensive review of systems was negative except for that written in the HPI. Objective:   Physical Exam:     Visit Vitals  /73 (BP 1 Location: Left upper arm, BP Patient Position: At rest)   Pulse 91   Temp 98.8 °F (37.1 °C)   Resp 20   Ht 5' 4\" (1.626 m)   Wt 87.5 kg (193 lb)   SpO2 98%   Breastfeeding No   BMI 33.13 kg/m²      O2 Device: None (Room air)    Temp (24hrs), Av.2 °F (36.8 °C), Min:97.7 °F (36.5 °C), Max:98.8 °F (37.1 °C)    No intake/output data recorded.  1901 -  0700  In: 18.2 [I.V.:18.2]  Out: -     General:  Alert, cooperative, no distress, appears stated age. Head:  Normocephalic, without obvious abnormality, atraumatic. Eyes:  Conjunctivae/corneas clear. PERRL, EOMs intact. Throat: Moist oral mucosa   Neck: Supple, symmetrical, trachea midline, no adenopathy, no JVD. Lungs:   Clear to auscultation bilaterally. diminished bases lr?l   Chest wall:  No tenderness or deformity. Heart:  Regular rate and rhythm, S1, S2 normal, no murmur, click, rub or gallop. Abdomen:   Soft, non-tender, not distended. Bowel sounds present, No rebound or guarding. Extremities: Extremities normal, atraumatic, no cyanosis. No edema   Pulses: 2+ and symmetric all extremities. Skin: Warm,dry   Neurologic: CNII-XII intact. No motor or sensory deficits. Data Review:       Recent Days:  Recent Labs     22  1733 22  1443   WBC 8.0 8.9   HGB 12.9 13.6   HCT 38.0 38.3    292     Recent Labs     22  1443   *   K 3.9      CO2 25   *   BUN 8   CREA 0.66   CA 9.2   ALB 3.9   TBILI 0.5   ALT 22     No results for input(s): PH, PCO2, PO2, HCO3, FIO2 in the last 72 hours.     24 Hour Results:  Recent Results (from the past 24 hour(s))   GLUCOSE, POC Collection Time: 03/18/22  4:16 PM   Result Value Ref Range    Glucose (POC) 198 (H) 65 - 117 mg/dL    Performed by Iman Barbosa (TVLR)    PTT    Collection Time: 03/18/22  5:33 PM   Result Value Ref Range    aPTT 61.3 (H) 21.2 - 34.1 sec    aPTT, therapeutic range   82 - 109 sec   CBC WITH AUTOMATED DIFF    Collection Time: 03/18/22  5:33 PM   Result Value Ref Range    WBC 8.0 3.6 - 11.0 K/uL    RBC 4.34 3.80 - 5.20 M/uL    HGB 12.9 11.5 - 16.0 g/dL    HCT 38.0 35.0 - 47.0 %    MCV 87.6 80.0 - 99.0 FL    MCH 29.7 26.0 - 34.0 PG    MCHC 33.9 30.0 - 36.5 g/dL    RDW 11.2 (L) 11.5 - 14.5 %    PLATELET 488 704 - 594 K/uL    MPV 11.9 8.9 - 12.9 FL    NRBC 0.0 0.0  WBC    ABSOLUTE NRBC 0.00 0.00 - 0.01 K/uL    NEUTROPHILS 66 32 - 75 %    LYMPHOCYTES 27 12 - 49 %    MONOCYTES 5 5 - 13 %    EOSINOPHILS 1 0 - 7 %    BASOPHILS 1 0 - 1 %    IMMATURE GRANULOCYTES 0 0 - 0.5 %    ABS. NEUTROPHILS 5.3 1.8 - 8.0 K/UL    ABS. LYMPHOCYTES 2.1 0.8 - 3.5 K/UL    ABS. MONOCYTES 0.4 0.0 - 1.0 K/UL    ABS. EOSINOPHILS 0.0 0.0 - 0.4 K/UL    ABS. BASOPHILS 0.0 0.0 - 0.1 K/UL    ABS. IMM.  GRANS. 0.0 0.00 - 0.04 K/UL    DF AUTOMATED     HEMOGLOBIN A1C WITH EAG    Collection Time: 03/18/22  5:33 PM   Result Value Ref Range    Hemoglobin A1c 12.0 (H) 4.0 - 5.6 %    Est. average glucose 298 mg/dL   LIPID PANEL    Collection Time: 03/18/22  5:33 PM   Result Value Ref Range    LIPID PROFILE        Cholesterol, total 314 (H) <200 mg/dL    Triglyceride 124 <150 mg/dL    HDL Cholesterol 47 mg/dL    LDL, calculated 242.2 (H) 0 - 100 mg/dL    VLDL, calculated 24.8 mg/dL    CHOL/HDL Ratio 6.7 (H) 0.0 - 5.0     GLUCOSE, POC    Collection Time: 03/18/22  9:06 PM   Result Value Ref Range    Glucose (POC) 281 (H) 65 - 117 mg/dL    Performed by Yimi Arce ([facility])    PTT    Collection Time: 03/19/22  2:41 AM   Result Value Ref Range    aPTT 51.3 (H) 21.2 - 34.1 sec    aPTT, therapeutic range   82 - 109 sec   GLUCOSE, POC    Collection Time: 03/19/22  7:35 AM   Result Value Ref Range    Glucose (POC) 224 (H) 65 - 117 mg/dL    Performed by Claudette Hurt Yudong(PCT)    PTT    Collection Time: 03/19/22 11:37 AM   Result Value Ref Range    aPTT 76.1 (H) 21.2 - 34.1 sec    aPTT, therapeutic range   82 - 109 sec   GLUCOSE, POC    Collection Time: 03/19/22 11:47 AM   Result Value Ref Range    Glucose (POC) 297 (H) 65 - 117 mg/dL    Performed by Claudette Hurt Yudong(PCT)        chest X-ray    Assessment/     Patient Active Problem List   Diagnosis Code    Type 2 diabetes mellitus with hyperglycemia, with long-term current use of insulin ([facility]) E11.65, Z79.4    Left foot pain M79.672    Benign essential HTN I10    Mixed hyperlipidemia E78.2    Multiple drug allergies Z88.9    ACS (acute coronary syndrome) ([facility]) I24.9         -Non-ST elevation myocardial infarction  -Multivessel coronary artery disease-anticipate transfer Monday to [facility].  -Type 2 diabetes uncontrolled, Insulin treated.  -likely diabetic gastroparesis  -HLD-ldl 242  -Benign essential hypertension, controlled  -Multiple medication allergy  -Obstructive sleep apnea noncompliant with CPAP  Plan:  -Continue medical management for acute coronary syndrome pending transfer to higher level of care Monday to [facility].   -At home she is on Humalog mix 50-50, 20 units in the morning and 24 with dinner, resuming this if we have it.  -Ssi/hypoglycemia protocol  -We will get nutrition to talk to her about recommended diet for gastroparesis and ischemic heart disease  -Encourage use of CPAP at home and compliance with recommendations  -Continues atorvastatin, increased to 80 mg nightly. Incidentally, she is listed allergic to 22 different medications of which does not appear any of them are statins. Anticipate transfer to higher level of care Monday  Case discussed with cardiologist,Dr Pepper Mota  She is full code    Care Plan discussed with: Patient/Family, Nurse and Consultant .     Total time spent with patient: 30 minutes. This dictation was done by dragon, computer voice recognition software. Often unanticipated grammatical, syntax, phones and other interpretive errors are inadvertently transcribed. Please excuse errors that have escaped final proofreading.     Lana Borden MD

## 2022-03-20 PROBLEM — E11.65 TYPE 2 DIABETES MELLITUS WITH HYPERGLYCEMIA, WITH LONG-TERM CURRENT USE OF INSULIN (HCC): Status: ACTIVE | Noted: 2020-12-02

## 2022-03-20 PROBLEM — Z79.4 TYPE 2 DIABETES MELLITUS WITH HYPERGLYCEMIA, WITH LONG-TERM CURRENT USE OF INSULIN (HCC): Status: ACTIVE | Noted: 2020-12-02

## 2022-03-20 LAB
ANION GAP SERPL CALC-SCNC: 5 MMOL/L (ref 5–15)
APTT PPP: 105.1 SEC (ref 21.2–34.1)
BASOPHILS # BLD: 0.1 K/UL (ref 0–0.1)
BASOPHILS NFR BLD: 1 % (ref 0–1)
BUN SERPL-MCNC: 14 MG/DL (ref 6–20)
BUN/CREAT SERPL: 24 (ref 12–20)
CA-I BLD-MCNC: 8.6 MG/DL (ref 8.5–10.1)
CHLORIDE SERPL-SCNC: 108 MMOL/L (ref 97–108)
CO2 SERPL-SCNC: 25 MMOL/L (ref 21–32)
COVID-19 RAPID TEST, COVR: NOT DETECTED
CREAT SERPL-MCNC: 0.59 MG/DL (ref 0.55–1.02)
DIFFERENTIAL METHOD BLD: ABNORMAL
EOSINOPHIL # BLD: 0.1 K/UL (ref 0–0.4)
EOSINOPHIL NFR BLD: 1 % (ref 0–7)
ERYTHROCYTE [DISTWIDTH] IN BLOOD BY AUTOMATED COUNT: 11.3 % (ref 11.5–14.5)
GLUCOSE BLD STRIP.AUTO-MCNC: 225 MG/DL (ref 65–117)
GLUCOSE BLD STRIP.AUTO-MCNC: 248 MG/DL (ref 65–117)
GLUCOSE BLD STRIP.AUTO-MCNC: 280 MG/DL (ref 65–117)
GLUCOSE BLD STRIP.AUTO-MCNC: 77 MG/DL (ref 65–117)
GLUCOSE SERPL-MCNC: 213 MG/DL (ref 65–100)
HCT VFR BLD AUTO: 36 % (ref 35–47)
HGB BLD-MCNC: 12.4 G/DL (ref 11.5–16)
IMM GRANULOCYTES # BLD AUTO: 0 K/UL (ref 0–0.04)
IMM GRANULOCYTES NFR BLD AUTO: 0 % (ref 0–0.5)
LYMPHOCYTES # BLD: 3 K/UL (ref 0.8–3.5)
LYMPHOCYTES NFR BLD: 33 % (ref 12–49)
MCH RBC QN AUTO: 30.4 PG (ref 26–34)
MCHC RBC AUTO-ENTMCNC: 34.4 G/DL (ref 30–36.5)
MCV RBC AUTO: 88.2 FL (ref 80–99)
MONOCYTES # BLD: 0.5 K/UL (ref 0–1)
MONOCYTES NFR BLD: 5 % (ref 5–13)
NEUTS SEG # BLD: 5.5 K/UL (ref 1.8–8)
NEUTS SEG NFR BLD: 60 % (ref 32–75)
NRBC # BLD: 0 K/UL (ref 0–0.01)
NRBC BLD-RTO: 0 PER 100 WBC
PERFORMED BY, TECHID: ABNORMAL
PERFORMED BY, TECHID: NORMAL
PLATELET # BLD AUTO: 236 K/UL (ref 150–400)
PMV BLD AUTO: 11.7 FL (ref 8.9–12.9)
POTASSIUM SERPL-SCNC: 3.6 MMOL/L (ref 3.5–5.1)
RBC # BLD AUTO: 4.08 M/UL (ref 3.8–5.2)
SODIUM SERPL-SCNC: 138 MMOL/L (ref 136–145)
THERAPEUTIC RANGE,PTTT: ABNORMAL SEC (ref 82–109)
WBC # BLD AUTO: 9.1 K/UL (ref 3.6–11)

## 2022-03-20 PROCEDURE — 80048 BASIC METABOLIC PNL TOTAL CA: CPT

## 2022-03-20 PROCEDURE — 74011250636 HC RX REV CODE- 250/636: Performed by: STUDENT IN AN ORGANIZED HEALTH CARE EDUCATION/TRAINING PROGRAM

## 2022-03-20 PROCEDURE — 74011250637 HC RX REV CODE- 250/637: Performed by: INTERNAL MEDICINE

## 2022-03-20 PROCEDURE — 87635 SARS-COV-2 COVID-19 AMP PRB: CPT

## 2022-03-20 PROCEDURE — 74011250636 HC RX REV CODE- 250/636: Performed by: INTERNAL MEDICINE

## 2022-03-20 PROCEDURE — 82962 GLUCOSE BLOOD TEST: CPT

## 2022-03-20 PROCEDURE — 74011250637 HC RX REV CODE- 250/637: Performed by: STUDENT IN AN ORGANIZED HEALTH CARE EDUCATION/TRAINING PROGRAM

## 2022-03-20 PROCEDURE — 65270000029 HC RM PRIVATE

## 2022-03-20 PROCEDURE — 85025 COMPLETE CBC W/AUTO DIFF WBC: CPT

## 2022-03-20 PROCEDURE — 85730 THROMBOPLASTIN TIME PARTIAL: CPT

## 2022-03-20 PROCEDURE — 74011636637 HC RX REV CODE- 636/637: Performed by: INTERNAL MEDICINE

## 2022-03-20 PROCEDURE — 74011000250 HC RX REV CODE- 250: Performed by: STUDENT IN AN ORGANIZED HEALTH CARE EDUCATION/TRAINING PROGRAM

## 2022-03-20 PROCEDURE — 36415 COLL VENOUS BLD VENIPUNCTURE: CPT

## 2022-03-20 RX ORDER — POTASSIUM CHLORIDE 750 MG/1
40 TABLET, FILM COATED, EXTENDED RELEASE ORAL
Status: COMPLETED | OUTPATIENT
Start: 2022-03-20 | End: 2022-03-20

## 2022-03-20 RX ADMIN — LISINOPRIL 5 MG: 5 TABLET ORAL at 09:11

## 2022-03-20 RX ADMIN — FAMOTIDINE 20 MG: 20 TABLET, FILM COATED ORAL at 21:26

## 2022-03-20 RX ADMIN — SODIUM CHLORIDE, PRESERVATIVE FREE 10 ML: 5 INJECTION INTRAVENOUS at 13:19

## 2022-03-20 RX ADMIN — METOPROLOL TARTRATE 12.5 MG: 25 TABLET, FILM COATED ORAL at 21:26

## 2022-03-20 RX ADMIN — SODIUM CHLORIDE, PRESERVATIVE FREE 10 ML: 5 INJECTION INTRAVENOUS at 05:43

## 2022-03-20 RX ADMIN — POTASSIUM CHLORIDE 40 MEQ: 750 TABLET, FILM COATED, EXTENDED RELEASE ORAL at 11:46

## 2022-03-20 RX ADMIN — MORPHINE SULFATE 2 MG: 2 INJECTION, SOLUTION INTRAMUSCULAR; INTRAVENOUS at 17:52

## 2022-03-20 RX ADMIN — ATORVASTATIN CALCIUM 80 MG: 40 TABLET, FILM COATED ORAL at 09:11

## 2022-03-20 RX ADMIN — FAMOTIDINE 20 MG: 20 TABLET, FILM COATED ORAL at 09:11

## 2022-03-20 RX ADMIN — INSULIN LISPRO 5 UNITS: 100 INJECTION, SOLUTION INTRAVENOUS; SUBCUTANEOUS at 16:40

## 2022-03-20 RX ADMIN — METOPROLOL TARTRATE 12.5 MG: 25 TABLET, FILM COATED ORAL at 09:11

## 2022-03-20 RX ADMIN — SODIUM CHLORIDE, PRESERVATIVE FREE 10 ML: 5 INJECTION INTRAVENOUS at 21:30

## 2022-03-20 RX ADMIN — ASPIRIN 81 MG CHEWABLE TABLET 81 MG: 81 TABLET CHEWABLE at 09:11

## 2022-03-20 RX ADMIN — INSULIN LISPRO 3 UNITS: 100 INJECTION, SOLUTION INTRAVENOUS; SUBCUTANEOUS at 09:10

## 2022-03-20 RX ADMIN — Medication 16 UNITS/KG/HR: at 11:43

## 2022-03-20 RX ADMIN — INSULIN LISPRO 5 UNITS: 100 INJECTION, SOLUTION INTRAVENOUS; SUBCUTANEOUS at 13:19

## 2022-03-20 NOTE — PROGRESS NOTES
[address] [facility] CARDIOLOGY      PROGRESS NOTE    Patient seen and examined. We are following for chest pain. S/p cardiac catheterization, with 90% Ostial LAD stenosis, LCx with luminal irregularities, RCA with 70% proximal disease. Plans to transfer to [facility] for bypass surgery Monday 3/21/22. She is sitting, no acute distress. She endoreses mild chest pain. Continues on heparin drip. Denies dyspnea. PHYSICAL EXAMINATION:  Blood pressure 108/68, heart rate 80  Cardiovascular exam has a heart with a RRR, normal S1 and S2. No murmur present. There are no rubs or gallops. Good peripheral pulses. No jugular venous distension. No carotid bruits are present. Respiratory exam reveals clear lung fields, no rales or rhonchi. Gastrointestinal exam has soft, nontender abdomen with normal bowel sounds. Lymphatic exam reveals no edema and no varicosities. No notable skin changes. Neurologic exam is nonfocal.   Right radial access site tender, no hematoma noted, cap refill < 3 sec, strength and sensation intact. Echocardiogram 3/14/2022    Left Ventricle: Left ventricle size is normal. Mildly increased wall thickness. Normal wall motion. Normal left ventricular systolic function. EF by 2D Simpsons Biplane is 64%. Diastolic dysfunction present with normal LV EF. Tricuspid Valve: RVSP is 12 mmHg. Pericardium: Trivial pericardial effusion present. No indication of cardiac tamponade. Discussed case with Dr. Lazaro Sotelo, our impressions and recommendations are as followed:     1. Coronary artery disease:   - HS Troponins 266->237  - EKGs is nonischemic.   -  3/14/22 echo showed EF 64% no wall motion abnormalities. - 3/14/22 NMST showed a moderate anterior, septal defect, reversible  - Cath with 90% Ostial LAD stenosis, LCx with luminal irregularities, RCA with 70% proximal disease. Given she is very young and diabetic (uncontrolled) A1c 12, she is best served with a Bypass surgery. Dr. Shanda Chung has spoken to Dr. Rubens Arias Kelly Toscano at [facility] and [facility] for transfer permission. They are able to accept the transfer Monday. Hospitalist to Hospitalist transfer has been initiated for tomorrow to [facility]  She is aware of plans and agreeable. - Brilinta discontinued. Will need a washout before surgery. Continue ASA, heparin gtt, metoprolol and Atorvastatin      2. Hypertension:  - blood pressure at goal  - continue to monitor. 3. Hyperlipidemia:  - , ,   - Continue statin therapy. Will consider Repatha in outpatient setting. 4. Diabetes:  - Uncontrolled  - A1C 12%    Please do not hesitate to call if additional questions arise. BINU Rogers  3/20/2022    GUNNAR Shahid Addendum:  Agree with findings and plan noted above. Transferring to [facility] for bypass surgery.

## 2022-03-20 NOTE — PROGRESS NOTES
Hospitalist Progress Note               Daily Progress Note: 3/20/2022    Per hpi:  Jewels Burton is a 43 y.o. female admitted 03/17 for evaluation of chest pain and NSTEMI. She underwent cardiac catheterization 3/18/22 which showed multivessel disease and recommending revascularization. She has known history of type 2 diabetes complicated by diabetic neuropathy, sleep apnea and essential hypertension and multiple drug allergies. She reports strong family hx josh dz as well. Cardiology rec  Transfer to [facility] for revascularization/bypass surgery. Pt is agreeable. Cardiology, Dr Pepper Mota, spoke to Dr. Andria Oglesby at [facility] and [place] [id number] for transfer permission. They are able to accept the transfer Monday. I have initiated transfer process thru [address] [place] [address] for hospitalist-hospitalist transfer. Subjective: The patient is seen for follow  up. Chart reviewed. She is resting comfortably this am,  Tolerated mso4 last night, she still is having intermittent twinges of cp. No tele events. No n/v. No sob. D/w transfer ctr, awaiting call back, rapid covid test recommended. She had neg test 1 week ago. Repeat. Heparin drip continues.     Medications reviewed  Current Facility-Administered Medications   Medication Dose Route Frequency    heparin (porcine) 1,000 unit/mL injection 4,000 Units  4,000 Units IntraVENous PRN    Or    heparin (porcine) 1,000 unit/mL injection 2,000 Units  2,000 Units IntraVENous PRN    glucose chewable tablet 16 g  4 Tablet Oral PRN    glucagon (GLUCAGEN) injection 1 mg  1 mg IntraMUSCular PRN    dextrose 10% infusion 0-250 mL  0-250 mL IntraVENous PRN    atorvastatin (LIPITOR) tablet 80 mg  80 mg Oral DAILY    insulin lispro protamine/insulin lispro (HUMALOG MIX 50/50) injection 20 Units  20 Units SubCUTAneous ACB    insulin lispro protamine/insulin lispro (HUMALOG MIX 50/50) injection 20 Units  20 Units SubCUTAneous ACD  famotidine (PEPCID) tablet 20 mg  20 mg Oral Q12H    morphine injection 2 mg  2 mg IntraVENous Q4H PRN    diphenhydrAMINE (BENADRYL) capsule 25 mg  25 mg Oral Q6H PRN    ondansetron (ZOFRAN) injection 4 mg  4 mg IntraVENous Q6H PRN    heparin 25,000 units in D5W 250 ml infusion  12-25 Units/kg/hr IntraVENous TITRATE    aspirin chewable tablet 81 mg  81 mg Oral DAILY    insulin lispro (HUMALOG) injection   SubCUTAneous AC&HS    lisinopriL (PRINIVIL, ZESTRIL) tablet 5 mg  5 mg Oral DAILY    sodium chloride (NS) flush 5-40 mL  5-40 mL IntraVENous Q8H    sodium chloride (NS) flush 5-40 mL  5-40 mL IntraVENous PRN    metoprolol tartrate (LOPRESSOR) tablet 12.5 mg  12.5 mg Oral Q12H       Review of Systems:   A comprehensive review of systems was negative except for that written in the HPI. Objective:   Physical Exam:     Visit Vitals  /68 (BP Patient Position: At rest;Lying)   Pulse 80   Temp 97.8 °F (36.6 °C)   Resp 20   Ht 5' 4\" (1.626 m)   Wt 87.5 kg (193 lb)   SpO2 98%   Breastfeeding No   BMI 33.13 kg/m²      O2 Device: None (Room air)    Temp (24hrs), Av.1 °F (36.7 °C), Min:97.7 °F (36.5 °C), Max:98.8 °F (37.1 °C)    No intake/output data recorded. No intake/output data recorded. General:  Alert, cooperative, no distress, appears stated age. Head:  Normocephalic, without obvious abnormality, atraumatic. Eyes:  Conjunctivae/corneas clear. PERRL, EOMs intact. Throat: Moist oral mucosa   Neck: Supple, symmetrical, trachea midline, no adenopathy, no JVD. Lungs:   Clear to auscultation bilaterally. diminished bases lr?l   Chest wall:  No tenderness or deformity. Heart:  Regular rate and rhythm, S1, S2 normal, no murmur, click, rub or gallop. Abdomen:   Soft, non-tender, not distended. Bowel sounds present, No rebound or guarding. Extremities: Extremities normal, atraumatic, no cyanosis. No edema   Pulses: 2+ and symmetric all extremities.    Skin: Warm,dry   Neurologic: CNII-XII intact. No motor or sensory deficits. Data Review:       Recent Days:  Recent Labs     03/20/22  0255 03/18/22  1733 03/17/22  1443   WBC 9.1 8.0 8.9   HGB 12.4 12.9 13.6   HCT 36.0 38.0 38.3    253 292     Recent Labs     03/20/22  0255 03/17/22  1443    134*   K 3.6 3.9    104   CO2 25 25   * 321*   BUN 14 8   CREA 0.59 0.66   CA 8.6 9.2   ALB  --  3.9   TBILI  --  0.5   ALT  --  22     No results for input(s): PH, PCO2, PO2, HCO3, FIO2 in the last 72 hours.     24 Hour Results:  Recent Results (from the past 24 hour(s))   PTT    Collection Time: 03/19/22 11:37 AM   Result Value Ref Range    aPTT 76.1 (H) 21.2 - 34.1 sec    aPTT, therapeutic range   82 - 109 sec   GLUCOSE, POC    Collection Time: 03/19/22 11:47 AM   Result Value Ref Range    Glucose (POC) 297 (H) 65 - 117 mg/dL    Performed by Chay Sanchez([facility])    GLUCOSE, POC    Collection Time: 03/19/22  3:54 PM   Result Value Ref Range    Glucose (POC) 250 (H) 65 - 117 mg/dL    Performed by Chay Sanchez([facility])    EKG, 12 LEAD, SUBSEQUENT    Collection Time: 03/19/22  5:27 PM   Result Value Ref Range    Ventricular Rate 84 BPM    Atrial Rate 84 BPM    P-R Interval 202 ms    QRS Duration 82 ms    Q-T Interval 382 ms    QTC Calculation (Bezet) 451 ms    Calculated P Axis 44 degrees    Calculated R Axis 17 degrees    Calculated T Axis 25 degrees    Diagnosis       Normal sinus rhythm  Anteroseptal infarct (cited on or before 17-MAR-2022)  Abnormal ECG  When compared with ECG of 17-MAR-2022 14:24,  Questionable change in initial forces of Anterolateral leads  Confirmed by Pamella SEPULVEDA St ([id number]) on 3/19/2022 10:01:59 PM     PTT    Collection Time: 03/19/22  7:54 PM   Result Value Ref Range    aPTT 102.2 (H) 21.2 - 34.1 sec    aPTT, therapeutic range   82 - 109 sec   GLUCOSE, POC    Collection Time: 03/19/22  8:26 PM   Result Value Ref Range    Glucose (POC) 278 (H) 65 - 117 mg/dL    Performed by [id number], BASIC Collection Time: 03/20/22  2:55 AM   Result Value Ref Range    Sodium 138 136 - 145 mmol/L    Potassium 3.6 3.5 - 5.1 mmol/L    Chloride 108 97 - 108 mmol/L    CO2 25 21 - 32 mmol/L    Anion gap 5 5 - 15 mmol/L    Glucose 213 (H) 65 - 100 mg/dL    BUN 14 6 - 20 mg/dL    Creatinine 0.59 0.55 - 1.02 mg/dL    BUN/Creatinine ratio 24 (H) 12 - 20      GFR est AA >60 >60 ml/min/1.73m2    GFR est non-AA >60 >60 ml/min/1.73m2    Calcium 8.6 8.5 - 10.1 mg/dL   CBC WITH AUTOMATED DIFF    Collection Time: 03/20/22  2:55 AM   Result Value Ref Range    WBC 9.1 3.6 - 11.0 K/uL    RBC 4.08 3.80 - 5.20 M/uL    HGB 12.4 11.5 - 16.0 g/dL    HCT 36.0 35.0 - 47.0 %    MCV 88.2 80.0 - 99.0 FL    MCH 30.4 26.0 - 34.0 PG    MCHC 34.4 30.0 - 36.5 g/dL    RDW 11.3 (L) 11.5 - 14.5 %    PLATELET 201 583 - 823 K/uL    MPV 11.7 8.9 - 12.9 FL    NRBC 0.0 0.0  WBC    ABSOLUTE NRBC 0.00 0.00 - 0.01 K/uL    NEUTROPHILS 60 32 - 75 %    LYMPHOCYTES 33 12 - 49 %    MONOCYTES 5 5 - 13 %    EOSINOPHILS 1 0 - 7 %    BASOPHILS 1 0 - 1 %    IMMATURE GRANULOCYTES 0 0 - 0.5 %    ABS. NEUTROPHILS 5.5 1.8 - 8.0 K/UL    ABS. LYMPHOCYTES 3.0 0.8 - 3.5 K/UL    ABS. MONOCYTES 0.5 0.0 - 1.0 K/UL    ABS. EOSINOPHILS 0.1 0.0 - 0.4 K/UL    ABS. BASOPHILS 0.1 0.0 - 0.1 K/UL    ABS. IMM.  GRANS. 0.0 0.00 - 0.04 K/UL    DF AUTOMATED     PTT    Collection Time: 03/20/22  2:55 AM   Result Value Ref Range    aPTT 105.1 (H) 21.2 - 34.1 sec    aPTT, therapeutic range   82 - 109 sec   GLUCOSE, POC    Collection Time: 03/20/22  8:18 AM   Result Value Ref Range    Glucose (POC) 225 (H) 65 - 117 mg/dL    Performed by Thang Marsh        chest X-ray    Assessment/     Patient Active Problem List   Diagnosis Code    Type 2 diabetes mellitus with hyperglycemia, with long-term current use of insulin ([facility]) E11.65, Z79.4    Left foot pain M79.672    Benign essential HTN I10    Mixed hyperlipidemia E78.2    Multiple drug allergies Z88.9    ACS (acute coronary syndrome) ([facility] [address] I24.9         -Non-ST elevation myocardial infarction  -Multivessel coronary artery disease-anticipate transfer Monday to [facility].  -Type 2 diabetes uncontrolled, Insulin treated. Multiple allergies including to lantus, On humulog mix 50/50 pta, not available, asked her to bring in.  -likely diabetic gastroparesis  -HLD, ldl 242  -Benign essential hypertension, controlled  -Multiple medication allergy  -Obstructive sleep apnea noncompliant with CPAP  Plan:  -Continue medical management for acute coronary syndrome pending transfer to higher level of care Monday to [facility].   -Initiated transfer process with b.s.transfer ctr.  -At home she is on Humalog mix 50-50, 20 units in the morning and 24 with dinner, resume but not available- family to bring in.   -Ssi/hypoglycemia protocol  -get nutrition to talk to her about recommended diet for gastroparesis and ischemic heart disease  -Encourage use of CPAP at home and compliance with recommendations  -Continues atorvastatin 80 mg nightly. Incidentally, she is listed allergic to 22 different medications of which does not appear any of them are statins. -rapid covid test today in anticipation of transfer tomorrow morning. Case discussed with cardiologist,Dr Andre Parada  She is full code    Care Plan discussed with: Patient/Family, Nurse and Consultant . Total time spent with patient: 30 minutes. This dictation was done by dragon, computer voice recognition software. Often unanticipated grammatical, syntax, phones and other interpretive errors are inadvertently transcribed. Please excuse errors that have escaped final proofreading.     Elias Hernandez MD

## 2022-03-21 VITALS
WEIGHT: 193 LBS | SYSTOLIC BLOOD PRESSURE: 119 MMHG | RESPIRATION RATE: 18 BRPM | DIASTOLIC BLOOD PRESSURE: 74 MMHG | BODY MASS INDEX: 32.95 KG/M2 | TEMPERATURE: 98.3 F | HEIGHT: 64 IN | HEART RATE: 83 BPM | OXYGEN SATURATION: 98 %

## 2022-03-21 PROBLEM — G47.33 OSA ON CPAP: Status: ACTIVE | Noted: 2022-03-21

## 2022-03-21 PROBLEM — I25.10 MULTIPLE VESSEL CORONARY ARTERY DISEASE: Status: ACTIVE | Noted: 2022-03-21

## 2022-03-21 PROBLEM — Z99.89 OSA ON CPAP: Status: ACTIVE | Noted: 2022-03-21

## 2022-03-21 LAB
APTT PPP: 69.5 SEC (ref 21.2–34.1)
APTT PPP: 95.6 SEC (ref 21.2–34.1)
GLUCOSE BLD STRIP.AUTO-MCNC: 148 MG/DL (ref 65–117)
GLUCOSE BLD STRIP.AUTO-MCNC: 162 MG/DL (ref 65–117)
GLUCOSE BLD STRIP.AUTO-MCNC: 306 MG/DL (ref 65–117)
PERFORMED BY, TECHID: ABNORMAL
THERAPEUTIC RANGE,PTTT: ABNORMAL SEC (ref 82–109)
THERAPEUTIC RANGE,PTTT: ABNORMAL SEC (ref 82–109)

## 2022-03-21 PROCEDURE — 74011000250 HC RX REV CODE- 250: Performed by: STUDENT IN AN ORGANIZED HEALTH CARE EDUCATION/TRAINING PROGRAM

## 2022-03-21 PROCEDURE — 74011636637 HC RX REV CODE- 636/637: Performed by: INTERNAL MEDICINE

## 2022-03-21 PROCEDURE — 85730 THROMBOPLASTIN TIME PARTIAL: CPT

## 2022-03-21 PROCEDURE — 82962 GLUCOSE BLOOD TEST: CPT

## 2022-03-21 PROCEDURE — 74011250637 HC RX REV CODE- 250/637: Performed by: STUDENT IN AN ORGANIZED HEALTH CARE EDUCATION/TRAINING PROGRAM

## 2022-03-21 PROCEDURE — 74011250636 HC RX REV CODE- 250/636: Performed by: STUDENT IN AN ORGANIZED HEALTH CARE EDUCATION/TRAINING PROGRAM

## 2022-03-21 PROCEDURE — 74011250637 HC RX REV CODE- 250/637: Performed by: INTERNAL MEDICINE

## 2022-03-21 PROCEDURE — 36415 COLL VENOUS BLD VENIPUNCTURE: CPT

## 2022-03-21 RX ORDER — GUAIFENESIN 100 MG/5ML
81 LIQUID (ML) ORAL DAILY
Qty: 30 TABLET | Refills: 0 | Status: SHIPPED
Start: 2022-03-22

## 2022-03-21 RX ORDER — METOPROLOL TARTRATE 25 MG/1
12.5 TABLET, FILM COATED ORAL EVERY 12 HOURS
Qty: 30 TABLET | Refills: 0 | Status: SHIPPED
Start: 2022-03-21 | End: 2022-04-20

## 2022-03-21 RX ORDER — ATORVASTATIN CALCIUM 80 MG/1
80 TABLET, FILM COATED ORAL DAILY
Qty: 30 TABLET | Refills: 0 | Status: SHIPPED
Start: 2022-03-22

## 2022-03-21 RX ORDER — HEPARIN SODIUM 10000 [USP'U]/100ML
12-25 INJECTION, SOLUTION INTRAVENOUS
Qty: 250 ML | Refills: 0 | Status: SHIPPED
Start: 2022-03-21

## 2022-03-21 RX ORDER — FAMOTIDINE 20 MG/1
20 TABLET, FILM COATED ORAL EVERY 12 HOURS
Qty: 30 TABLET | Refills: 0 | Status: SHIPPED
Start: 2022-03-21

## 2022-03-21 RX ORDER — INSULIN LISPRO 100 [IU]/ML
INJECTION, SOLUTION INTRAVENOUS; SUBCUTANEOUS
Qty: 1 EACH | Refills: 0 | Status: SHIPPED
Start: 2022-03-21

## 2022-03-21 RX ORDER — LISINOPRIL 5 MG/1
5 TABLET ORAL DAILY
Qty: 30 TABLET | Refills: 0 | Status: SHIPPED
Start: 2022-03-22

## 2022-03-21 RX ADMIN — ATORVASTATIN CALCIUM 80 MG: 40 TABLET, FILM COATED ORAL at 08:15

## 2022-03-21 RX ADMIN — FAMOTIDINE 20 MG: 20 TABLET, FILM COATED ORAL at 08:16

## 2022-03-21 RX ADMIN — LISINOPRIL 5 MG: 5 TABLET ORAL at 08:16

## 2022-03-21 RX ADMIN — INSULIN LISPRO 2 UNITS: 100 INJECTION, SOLUTION INTRAVENOUS; SUBCUTANEOUS at 17:32

## 2022-03-21 RX ADMIN — INSULIN LISPRO 2 UNITS: 100 INJECTION, SOLUTION INTRAVENOUS; SUBCUTANEOUS at 08:21

## 2022-03-21 RX ADMIN — Medication 16 UNITS/KG/HR: at 08:19

## 2022-03-21 RX ADMIN — SODIUM CHLORIDE, PRESERVATIVE FREE 10 ML: 5 INJECTION INTRAVENOUS at 06:01

## 2022-03-21 RX ADMIN — METOPROLOL TARTRATE 12.5 MG: 25 TABLET, FILM COATED ORAL at 08:15

## 2022-03-21 RX ADMIN — ASPIRIN 81 MG CHEWABLE TABLET 81 MG: 81 TABLET CHEWABLE at 08:15

## 2022-03-21 RX ADMIN — INSULIN LISPRO 7 UNITS: 100 INJECTION, SOLUTION INTRAVENOUS; SUBCUTANEOUS at 11:28

## 2022-03-21 NOTE — DISCHARGE INSTRUCTIONS
Patient Education        Angina: Care Instructions  Your Care Instructions     You have a problem called angina. Angina happens when there is not enough blood flow to your heart muscle. Angina is a sign of coronary artery disease (CAD). CAD occurs when blood vessels that supply the heart become narrowed. Having CAD increases your risk of a heart attack. Chest pain or pressure is the most common symptom of angina. But some people have other symptoms, like:  · Pain, pressure, or a strange feeling in the back, neck, jaw, or upper belly, or in one or both shoulders or arms. · Shortness of breath. · Nausea or vomiting. · Lightheadedness or sudden weakness. · Fast or irregular heartbeat. Women are somewhat more likely than men to have angina symptoms like shortness of breath, nausea, and back or jaw pain. Angina can be dangerous. That's why it is important to pay attention to your symptoms. Know what is typical for you, learn how to control your symptoms, and understand when you need to get treatment. A change in your usual pattern of symptoms is an emergency. It may mean that you are having a heart attack. The doctor has checked you carefully, but problems can develop later. If you notice any problems or new symptoms, get medical treatment right away. Follow-up care is a key part of your treatment and safety. Be sure to make and go to all appointments, and call your doctor if you are having problems. It's also a good idea to know your test results and keep a list of the medicines you take. How can you care for yourself at home? Medicines    · If your doctor has given you nitroglycerin for angina symptoms, keep it with you at all times. If you have symptoms, sit down and rest, and take the first dose of nitroglycerin as directed. If your symptoms get worse or are not getting better within 5 minutes, call 911 right away. Stay on the phone.  The emergency  will give you further instructions.     · If your doctor advises it, take 1 low-dose aspirin a day to prevent heart attack.     · Be safe with medicines. Take your medicines exactly as prescribed. Call your doctor if you think you are having a problem with your medicine. You will get more details on the specific medicines your doctor prescribes. Lifestyle changes    · Do not smoke. If you need help quitting, talk to your doctor about stop-smoking programs and medicines. These can increase your chances of quitting for good.     · Eat a heart-healthy diet that is low in saturated fat and salt, and is high in fiber. Talk to your doctor or a dietitian about healthy eating.     · Stay at a healthy weight. Or lose weight if you need to. Activity    · Talk to your doctor about a level of activity that is safe for you.     · If an activity causes angina symptoms, stop and rest.   When should you call for help? Call 911 anytime you think you may need emergency care. For example, call if:    · You passed out (lost consciousness).     · You have symptoms of a heart attack. These may include:  ? Chest pain or pressure, or a strange feeling in the chest.  ? Sweating. ? Shortness of breath. ? Nausea or vomiting. ? Pain, pressure, or a strange feeling in the back, neck, jaw, or upper belly or in one or both shoulders or arms. ? Lightheadedness or sudden weakness. ? A fast or irregular heartbeat. After you call 911, the  may tell you to chew 1 adult-strength or 2 to 4 low-dose aspirin. Wait for an ambulance. Do not try to drive yourself.     · You have angina symptoms that do not go away with rest or are not getting better within 5 minutes after you take a dose of nitroglycerin. Call your doctor now if:    · Your angina symptoms seem worse but still follow your typical pattern. You can predict when symptoms will happen, but they may come on sooner, feel worse, or last longer.     · You feel dizzy or lightheaded, or you feel like you may faint.    Watch closely for changes in your health, and be sure to contact your doctor if you have any problems. Where can you learn more? Go to http://www.gray.com/  Enter H129 in the search box to learn more about \"Angina: Care Instructions. \"  Current as of: January 10, 2022               Content Version: 13.2  © 2006-2022 [employer]. Care instructions adapted under license by [employer] (which disclaims liability or warranty for this information). If you have questions about a medical condition or this instruction, always ask your healthcare professional. [address] any warranty or liability for your use of this information.

## 2022-03-21 NOTE — PROGRESS NOTES
Hospitalist Progress Note               Daily Progress Note: 3/21/2022    Per hpi:  Bentley Zazueta is a 43 y.o. female admitted 03/17 for evaluation of chest pain and NSTEMI. She underwent cardiac catheterization 3/18/22 which showed multivessel disease and recommending revascularization. She has known history of type 2 diabetes complicated by diabetic neuropathy, sleep apnea and essential hypertension and multiple drug allergies. She reports strong family hx cardijuan diego dz as well. Cardiology rec  Transfer to [facility] for revascularization/bypass surgery. Pt is agreeable. Cardiology, Dr Ian Mcgee, spoke to Dr. Negin Rosas at [facility] and [place]  for transfer permission. They are able to accept the transfer Monday. I have initiated transfer process. Subjective: The patient is seen for follow  up. Chart is reviewed, she is seen at bedside, she is comfortable sitting up in a chair no chest pain currently. She did have some twinges of chest pain earlier this morning. Rapid Covid test - 3/20/2022    Hemodynamically stable currently tolerating room air. Discussed with transfer center earlier this morning, awaiting callback from hospitalist hospitalist signout. She can be transferred to [facility] once arranged. Heparin drip continues.     Medications reviewed  Current Facility-Administered Medications   Medication Dose Route Frequency    insulin lispro protamine/insulin lispro (HUMALOG MIX 50/50) injection 24 Units (Patient Supplied)  24 Units SubCUTAneous ACB    insulin lispro protamine/insulin lispro (HUMALOG MIX 50/50) injection 20 Units (Patient Supplied)  20 Units SubCUTAneous ACD    heparin (porcine) 1,000 unit/mL injection 4,000 Units  4,000 Units IntraVENous PRN    Or    heparin (porcine) 1,000 unit/mL injection 2,000 Units  2,000 Units IntraVENous PRN    glucose chewable tablet 16 g  4 Tablet Oral PRN    glucagon (GLUCAGEN) injection 1 mg  1 mg IntraMUSCular PRN    dextrose 10% infusion 0-250 mL  0-250 mL IntraVENous PRN    atorvastatin (LIPITOR) tablet 80 mg  80 mg Oral DAILY    famotidine (PEPCID) tablet 20 mg  20 mg Oral Q12H    morphine injection 2 mg  2 mg IntraVENous Q4H PRN    ondansetron (ZOFRAN) injection 4 mg  4 mg IntraVENous Q6H PRN    heparin 25,000 units in D5W 250 ml infusion  12-25 Units/kg/hr IntraVENous TITRATE    aspirin chewable tablet 81 mg  81 mg Oral DAILY    insulin lispro (HUMALOG) injection   SubCUTAneous AC&HS    lisinopriL (PRINIVIL, ZESTRIL) tablet 5 mg  5 mg Oral DAILY    sodium chloride (NS) flush 5-40 mL  5-40 mL IntraVENous Q8H    sodium chloride (NS) flush 5-40 mL  5-40 mL IntraVENous PRN    metoprolol tartrate (LOPRESSOR) tablet 12.5 mg  12.5 mg Oral Q12H       Review of Systems:   A comprehensive review of systems was negative except for that written in the HPI. Objective:   Physical Exam:     Visit Vitals  /76 (BP 1 Location: Right upper arm, BP Patient Position: At rest;Lying)   Pulse 76   Temp 98.2 °F (36.8 °C)   Resp 19   Ht 5' 4\" (1.626 m)   Wt 87.5 kg (193 lb)   SpO2 98%   Breastfeeding No   BMI 33.13 kg/m²      O2 Device: None (Room air)    Temp (24hrs), Av.1 °F (36.7 °C), Min:97.6 °F (36.4 °C), Max:98.2 °F (36.8 °C)    No intake/output data recorded.  1901 -  0700  In: -   Out: 1     General:  Alert, cooperative, no distress, appears stated age. Head:  Normocephalic, without obvious abnormality, atraumatic. Eyes:  Conjunctivae/corneas clear. EOMs intact. Throat: Moist oral mucosa   Neck: Supple, symmetrical, trachea midline, no adenopathy, no JVD. Lungs:   Clear to auscultation bilaterally. l   Chest wall:  No tenderness or deformity. Heart:  Regular rate and rhythm, S1, S2 normal, no murmur, click, rub or gallop. Abdomen:   Soft, non-tender, not distended. Bowel sounds present, No rebound or guarding. Extremities: Extremities normal, atraumatic, no cyanosis.  No edema   Pulses: 2+ and symmetric all extremities. Skin: Warm,dry   Neurologic: CNII-XII intact. No motor or sensory deficits. Data Review:       Recent Days:  Recent Labs     03/20/22  0255 03/18/22  1733   WBC 9.1 8.0   HGB 12.4 12.9   HCT 36.0 38.0    253     Recent Labs     03/20/22  0255      K 3.6      CO2 25   *   BUN 14   CREA 0.59   CA 8.6     No results for input(s): PH, PCO2, PO2, HCO3, FIO2 in the last 72 hours. 24 Hour Results:  Recent Results (from the past 24 hour(s))   GLUCOSE, POC    Collection Time: 03/20/22 11:17 AM   Result Value Ref Range    Glucose (POC) 280 (H) 65 - 117 mg/dL    Performed by [employer] Vazquez    GLUCOSE, POC    Collection Time: 03/20/22  3:40 PM   Result Value Ref Range    Glucose (POC) 248 (H) 65 - 117 mg/dL    Performed by Paulina Vazquez    GLUCOSE, POC    Collection Time: 03/20/22  9:05 PM   Result Value Ref Range    Glucose (POC) 77 65 - 117 mg/dL    Performed by Cynthia Park    PTT    Collection Time: 03/21/22  4:07 AM   Result Value Ref Range    aPTT 69.5 (H) 21.2 - 34.1 sec    aPTT, therapeutic range   82 - 109 sec   GLUCOSE, POC    Collection Time: 03/21/22  7:13 AM   Result Value Ref Range    Glucose (POC) 162 (H) 65 - 117 mg/dL    Performed by Matilde Sanchez(PCT)        chest X-ray    Assessment/     Patient Active Problem List   Diagnosis Code    Type 2 diabetes mellitus with hyperglycemia, with long-term current use of insulin ([facility]) E11.65, Z79.4    Left foot pain M79.672    Benign essential HTN I10    Mixed hyperlipidemia E78.2    Multiple drug allergies Z88.9    ACS (acute coronary syndrome) ([facility]) I24.9    Multiple vessel coronary artery disease I25.10    MILADIS on CPAP G47.33, Z99.89         -Non-ST elevation myocardial infarction  -Multivessel coronary artery disease-anticipate transfer 3/21/22 to [facility].  -Type 2 diabetes uncontrolled, Insulin treated.  Multiple allergies including to lantus, On humulog mix 50/50 pta, not available, she brought her in from home.  -likely diabetic gastroparesis, will ultimately need further work-up  -HLD, ldl 242, high intensity statin now and though multiple allergies does not appear to be on allergy list.  -Benign essential hypertension, controlled  -Multiple medication allergy  -Obstructive sleep apnea noncompliant with CPAP  Plan:  -Continue medical management for acute coronary syndrome pending transfer to higher level of care today to [facility].     See discharge summary. Case discussed with cardiologist,Dr Andre Parada  She is full code    Care Plan discussed with: Patient/Family, Nurse and Consultant . Total time spent with patient: 30 minutes. This dictation was done by dragon, computer voice recognition software. Often unanticipated grammatical, syntax, phones and other interpretive errors are inadvertently transcribed. Please excuse errors that have escaped final proofreading.     Elias Hernandez MD

## 2022-03-21 NOTE — PROGRESS NOTES
Spoke with patient about the possibility of outpatient cardiac rehab referral after CABG. Patient was made aware of 2 facilities close to where she lives [facility] [place] Patient stated that she will speak with her family and decide which facility she will choose at a later time.     Defer to cardiothoracic surgeon for referral after CABG

## 2022-03-21 NOTE — DISCHARGE SUMMARY
HOSPITALIST DISCHARGE SUMMARY    NAME: Edin Isaacs   :  1985   MRN:  [id number]     Date/Time:  3/21/2022 9:57 AM    DISCHARGE DIAGNOSIS:  Principal Problem:    ACS (acute coronary syndrome) ([facility] (3/17/2022)    Active Problems:    Type 2 diabetes mellitus with hyperglycemia, with long-term current use of insulin ([facility] (2020)      Benign essential HTN (2020)      Mixed hyperlipidemia (2020)      Multiple drug allergies (2020)      Multiple vessel coronary artery disease (3/21/2022)      MILADIS on CPAP (3/21/2022)        Admission Diagnosis:  Acute coronary syndrome    CONSULTATIONS: Cardiology and Hospitalist    Procedures: see electronic medical records for all procedures/Xrays and details which were not copied into this note but were reviewed prior to creation of Plan.       ______________________________________________________________________  DISCHARGE SUMMARY/HOSPITAL COURSE:  for full details see H&P, daily progress notes, labs, consult notes. Kzrysztof Saldana a 43 y. o. female admitted  for evaluation of chest pain and NSTEMI.  She underwent cardiac catheterization 3/18/22 which showed multivessel disease and recommending revascularization. She has known history of type 2 diabetes complicated by diabetic neuropathy, sleep apnea and essential hypertension and multiple drug allergies. She reports strong family hx cardia dz as well.     Cardiology rec  Transfer to [facility] for revascularization/bypass surgery. Pt is agreeable. Cardiology, Dr Adelaide Brittle, spoke to Dr. Melissa Dale at [facility] and [facility] for transfer permission. [facility] are able to accept the transfer Monday. I have initiated transfer process thru [facility] transfer center for hospitalist-hospitalist transfer.     Procedures    LEFT HEART CATH / CORONARY ANGIOGRAPHY       Pre Procedure Diagnosis    Chest pain, unspecified type [R07.9] Indications    Chest pain, unspecified type [R07.9 (ICD-10-CM)]     Conclusion    · Procedures performed: [facility], Selective right and left coronary angiography, moderate sedation 21 minutes  · Findings: 90% ostial LAD. 70% proximal RCA. Luminal irregularities of Lcx  · Recommendations: Given her age and diabetes. I will transfer her for CABG evaluation. Will stop Brilinta. 2d echo: 3/14/22       Left Ventricle: Left ventricle size is normal. Mildly increased wall thickness. Normal wall motion. Normal left ventricular systolic function. EF by 2D Simpsons Biplane is 64%. Diastolic dysfunction present with normal LV EF.   Tricuspid Valve: RVSP is 12 mmHg.   Pericardium: Trivial pericardial effusion present. No indication of cardiac tamponade. In regards to dm2, insulin treated, a1c 12, multiple allergies including lantus, takes humulog 50/50 which she brought from home. I suspect some gastroparesis as well, asked nutrition to provide dietary rec's. Prob ultimately will need work-up for this. Rapid covid-10 on 3/20/22, \"not detected\"    _______________________________________________________________________  Medications Reviewed:  DISCHARGE MEDICATIONS:   Current Discharge Medication List      START taking these medications    Details   aspirin 81 mg chewable tablet Take 1 Tablet by mouth daily. Qty: 30 Tablet, Refills: 0  Start date: 3/22/2022      atorvastatin (LIPITOR) 80 mg tablet Take 1 Tablet by mouth daily. Qty: 30 Tablet, Refills: 0  Start date: 3/22/2022      famotidine (PEPCID) 20 mg tablet Take 1 Tablet by mouth every twelve (12) hours. Qty: 30 Tablet, Refills: 0  Start date: 3/21/2022      heparin sodium,porcine/D5W (heparin 25,000 units in D5W 250 ml) 25,000 unit/250 mL(100 unit/mL) infusion 1,050-2,187.5 Units/hr by IntraVENous route TITRATE.  Indications: acs  Qty: 250 mL, Refills: 0  Start date: 3/21/2022    Comments: Continues heparin per acs protocol      insulin lispro (HUMALOG) 100 unit/mL injection INITIATE CORRECTIVE INSULIN PROTOCOL (PIO):  RX PIO Normal Sensitivity (Average weight)    AC (before meals), Q6H, and Q4H CORRECTIONAL SCALE only For Blood Sugar (mg/dl) of :             140-199=2 units            200-249=3 units  250-299=5 units  300-349=7 units  350 or greater = Call MD  Give in addition to basal medications. Do Not Hold for NPO    BEDTIME CORRECTIONAL sliding scale when scheduled:  200-249=2 units  250-299=3 units   300-349=4 units  350 or greater = Call MD  Give in addition to basal medications. Do Not Hold for NPO Fast Acting - Administer Immediately - or within 15 minutes of start of meal, if mealtime coverage. Qty: 1 Each, Refills: 0  Start date: 3/21/2022      metoprolol tartrate (LOPRESSOR) 25 mg tablet Take 0.5 Tablets by mouth every twelve (12) hours for 30 days. Qty: 30 Tablet, Refills: 0  Start date: 3/21/2022, End date: 4/20/2022         CONTINUE these medications which have CHANGED    Details   lisinopriL (PRINIVIL, ZESTRIL) 5 mg tablet Take 1 Tablet by mouth daily. Qty: 30 Tablet, Refills: 0  Start date: 3/22/2022         CONTINUE these medications which have NOT CHANGED    Details   !! insulin lispro protamine/insulin lispro (HumaLOG Mix 50-50 Insuln U-100) 100 unit/mL (50-50) injection 20 Units by SubCUTAneous route Daily (before breakfast). !! insulin lispro protamine/insulin lispro (HumaLOG Mix 50-50 Insuln U-100) 100 unit/mL (50-50) injection 24 Units by SubCUTAneous route daily (with dinner). triamcinolone acetonide 0.025 % lotion Apply  to affected area as needed. Indications: inflammation of the skin due to an allergy       !! - Potential duplicate medications found. Please discuss with provider.       STOP taking these medications       BD Radha 2nd Gen Pen Needle 32 gauge x 5/32\" ndle Comments:   Reason for Stopping:         Insulin Needles, Disposable, 31 gauge x 5/16\" ndle Comments:   Reason for Stopping:         HumaLOG Mix 75-25 KwikPen flexpen Comments:   Reason for Stopping:         True Metrix Glucose Test Strip strip Comments:   Reason for Stopping:         TRUEplus Lancets 28 gauge misc Comments:   Reason for Stopping:         acetaminophen (TylenoL) 325 mg tablet Comments:   Reason for Stopping:         naproxen sodium (Aleve) 220 mg cap Comments:   Reason for Stopping:                 Recommended diet:  Cardiac Diet and Diabetic Diet  Recommended activity:Activity as tolerated       Follow Up: Pending course at [facility].     Dr. Oneyda Larsen, Otoniel Carson, FNP, pcp and cardiology Dr Dread Concepcion anticipated follow up.  _______________________________________________________________________  DISPOSITION:    Home with Family:    Home with HH/PT/OT/RN:    SNF/LTC:    ELISHA:    OTHER: [facility]   ________________________________________________________________________    Total time spent in discharge (min): 40   ________________________________________________________________________    Care Plan discussed with: Transfer ctr, caroline flores, Dr Willard Zimmer at [facility], hospitalist accepted. Cardiology previously spoke with Dr Gabriela Dey.     Comments   Patient x    Family  x    RN x    Care Manager x                   Consultant:  x    ________________________________________________________________________  Attending Physician: Wendy Bustillo MD  ________________________________________________________________________  **Lab Data Reviewed:  Recent Results (from the past 24 hour(s))   GLUCOSE, POC    Collection Time: 03/20/22 11:17 AM   Result Value Ref Range    Glucose (POC) 280 (H) 65 - 117 mg/dL    Performed by [facility]    GLUCOSE, POC    Collection Time: 03/20/22  3:40 PM   Result Value Ref Range    Glucose (POC) 248 (H) 65 - 117 mg/dL    Performed by [facility]    GLUCOSE, POC    Collection Time: 03/20/22  9:05 PM   Result Value Ref Range    Glucose (POC) 77 65 - 117 mg/dL    Performed by Jazzmine Dickerson    PTT Collection Time: 03/21/22  4:07 AM   Result Value Ref Range    aPTT 69.5 (H) 21.2 - 34.1 sec    aPTT, therapeutic range   82 - 109 sec   GLUCOSE, POC    Collection Time: 03/21/22  7:13 AM   Result Value Ref Range    Glucose (POC) 162 (H) 65 - 117 mg/dL    Performed by Radhika Sanchez(PCT)

## 2022-03-21 NOTE — PROGRESS NOTES
Nutrition Education    · Verbally reviewed information with Patient and Family member  · Educated on Carbohydrate consistent diet, carbohydrate counting, gastroparesis/anti-reflux diet choices. · Written educational materials provided. · Contact name and number provided. · Refer to Patient Education activity for more details.     Electronically signed by Esdras Uriostegui RD on 3/21/2022 at 3:52 PM    Contact Number: [phone]

## 2022-03-21 NOTE — PROGRESS NOTES
[facility] CARDIOLOGY      PROGRESS NOTE    Patient seen and examined. We are following for chest pain. S/p cardiac catheterization, with 90% Ostial LAD stenosis, LCx with luminal irregularities, RCA with 70% proximal disease. Plans to transfer to [facility] for bypass surgery today for bypass surgery. She is sitting, no acute distress. She endoreses mild chest pain. Continues on heparin drip. Denies dyspnea. PHYSICAL EXAMINATION:  Blood pressure 121/76, heart rate 79. Cardiovascular exam has a heart with a RRR, normal S1 and S2. No murmur present. There are no rubs or gallops. Good peripheral pulses. No jugular venous distension. No carotid bruits are present. Respiratory exam reveals clear lung fields, no rales or rhonchi. Gastrointestinal exam has soft, nontender abdomen with normal bowel sounds. Lymphatic exam reveals no edema and no varicosities. No notable skin changes. Neurologic exam is nonfocal.   Right radial access site tender, no hematoma noted, cap refill < 3 sec, strength and sensation intact. Echocardiogram 3/14/2022    Left Ventricle: Left ventricle size is normal. Mildly increased wall thickness. Normal wall motion. Normal left ventricular systolic function. EF by 2D Simpsons Biplane is 64%. Diastolic dysfunction present with normal LV EF.   Tricuspid Valve: RVSP is 12 mmHg.   Pericardium: Trivial pericardial effusion present. No indication of cardiac tamponade. Discussed case with Dr. Gen Aguilar, our impressions and recommendations are as followed:     1. Coronary artery disease:   - HS Troponins 266->237  - EKGs is nonischemic.   -  3/14/22 echo showed EF 64% no wall motion abnormalities. - 3/14/22 NMST showed a moderate anterior, septal defect, reversible  - Cath with 90% Ostial LAD stenosis, LCx with luminal irregularities, RCA with 70% proximal disease. Recommend CABG due to age and history of uncontrolled DM A1c 12.  Transfer to [facility] today via Hospitalist to Hospitalist. Patient is agreeable to current plan. -  Brilinta discontinued. Will need a washout before surgery. Continue ASA, heparin gtt, metoprolol and Atorvastatin      2. Hypertension:  - blood pressure at goal  - continue to monitor. 3. Hyperlipidemia:  - , ,   - Continue statin therapy. Will consider Repatha in outpatient setting. 4. Diabetes:  - Uncontrolled  - A1C 12%    Thank you for involving us in the care of this patient. Please do not hesitate to call if additional questions arise. Nishant Headley, CHARLEY  3/21/2022      Dr. Jameel Srivastava Cardiologist    [facility]  [address], [address] [facility]  [phone]

## 2022-03-21 NOTE — PROGRESS NOTES
Discharge plan of care/case management plan validated with provider discharge order. Discharge medications reviewed with pt and appropriate educational materials and side effects teaching were provided. EMTALA copy provided to alexandra thomas for Rub[facility], other EMTALA copy put in pt's chart. Pt provided personal copies of discharge paperwork, summary of care that was also all provided to alexandra. keithar took pt with two IVs one in each AC, heparin drip running 14 mL/hr at 16 u/kg/hr. [facility] connected pt to their cardiac monitoring and hospitals tele box was returned.     Pt's personal insulin from the refrigerator returned to pt and made aware to transport so it does not get lost. Family aware of transport to [facility] at room 339

## 2022-03-22 ENCOUNTER — TELEPHONE (OUTPATIENT)
Dept: CARDIOLOGY | Age: 37
End: 2022-03-22

## 2022-03-24 PROBLEM — Z99.89 OSA ON CPAP: Status: ACTIVE | Noted: 2022-03-21

## 2022-03-24 PROBLEM — G47.33 OSA ON CPAP: Status: ACTIVE | Noted: 2022-03-21

## 2022-03-24 PROBLEM — I24.9 ACS (ACUTE CORONARY SYNDROME) (HCC): Status: ACTIVE | Noted: 2022-03-17

## 2022-03-24 PROBLEM — I25.10 MULTIPLE VESSEL CORONARY ARTERY DISEASE: Status: ACTIVE | Noted: 2022-03-21

## 2022-07-20 ENCOUNTER — TRANSCRIBE ORDER (OUTPATIENT)
Dept: SCHEDULING | Age: 37
End: 2022-07-20

## 2022-07-20 DIAGNOSIS — M79.89 SWELLING OF UPPER EXTREMITY: Primary | ICD-10-CM

## 2022-09-16 ENCOUNTER — TRANSCRIBE ORDER (OUTPATIENT)
Dept: SCHEDULING | Age: 37
End: 2022-09-16

## 2022-09-16 DIAGNOSIS — R60.9 SWELLING: Primary | ICD-10-CM

## 2022-09-22 ENCOUNTER — HOSPITAL ENCOUNTER (OUTPATIENT)
Dept: VASCULAR SURGERY | Age: 37
Discharge: HOME | End: 2022-09-22
Attending: NURSE PRACTITIONER
Payer: MEDICAID

## 2022-09-22 DIAGNOSIS — R60.9 SWELLING: ICD-10-CM

## 2022-09-22 PROCEDURE — 93970 EXTREMITY STUDY: CPT

## 2023-04-23 DIAGNOSIS — M79.89 SWELLING OF UPPER EXTREMITY: Primary | ICD-10-CM

## 2023-04-24 DIAGNOSIS — M79.89 SWELLING OF UPPER EXTREMITY: Primary | ICD-10-CM

## (undated) DEVICE — CATH 5F 100CM JL35 -- DXTERITY

## (undated) DEVICE — SYRINGE MED 10ML RED POLYCARB BRL FIX M LUER CONN FLAT GRP

## (undated) DEVICE — GUIDEWIRE VASC L260CM DIA0.035IN RAD 3MM J TIP L7CM PTFE

## (undated) DEVICE — CATH 5F 100CM JR40 -- DXTERITY

## (undated) DEVICE — WASTEBAG DRIP/ADAPTER: Brand: MEDLINE INDUSTRIES, INC.

## (undated) DEVICE — SURGICAL PROCEDURE TRAY CRD CATH 3 PRT

## (undated) DEVICE — GLIDESHEATH SLENDER STAINLESS STEEL KIT: Brand: GLIDESHEATH SLENDER

## (undated) DEVICE — 3M™ TEGADERM™ TRANSPARENT FILM DRESSING FRAME STYLE, 1626W, 4 IN X 4-3/4 IN (10 CM X 12 CM), 50/CT 4CT/CASE: Brand: 3M™ TEGADERM™

## (undated) DEVICE — TUBING PRESS INJ FLX SH 30IN --

## (undated) DEVICE — 3M™ STERI-DRAPE™ SMALL DRAPE WITH ADHESIVE APERTURE 1092 25/BX,4/CS&#X20;: Brand: STERI-DRAPE™